# Patient Record
Sex: FEMALE | Race: WHITE | Employment: PART TIME | URBAN - METROPOLITAN AREA
[De-identification: names, ages, dates, MRNs, and addresses within clinical notes are randomized per-mention and may not be internally consistent; named-entity substitution may affect disease eponyms.]

---

## 2017-01-13 DIAGNOSIS — N94.6 DYSMENORRHEA: ICD-10-CM

## 2017-01-13 RX ORDER — DESOGESTREL AND ETHINYL ESTRADIOL 0.15-0.03
KIT ORAL
Qty: 4 PACKET | Refills: 0 | Status: SHIPPED | OUTPATIENT
Start: 2017-01-13 | End: 2018-08-03

## 2017-01-13 NOTE — TELEPHONE ENCOUNTER
Please check with patient for date of last Pap. Interval is usually every 3 years. We don't have any previous results on record and I usually do not write for contraceptives as I don't manage women's health issues. If she is due for Pap smear I would like her to have Ob/Gyn take over OCP management.      Requested Prescriptions     Signed Prescriptions Disp Refills    APRI 0.15-0.03 mg tab 4 Packet 0     Sig: TAKE 1 TABLET BY MOUTH EVERY DAY     Authorizing Provider: Ramesh Ferrera

## 2017-02-22 ENCOUNTER — TELEPHONE (OUTPATIENT)
Dept: FAMILY MEDICINE CLINIC | Age: 22
End: 2017-02-22

## 2017-02-22 NOTE — TELEPHONE ENCOUNTER
Dr. Meir Hernandez, is there another dosage associated with this birth control for dx dysmenorrhea? Please advise if we are prescribing for pt per last telephone encounter. FYI last rx sent to [facility].

## 2017-02-22 NOTE — TELEPHONE ENCOUNTER
Pt is calling about her Birth control wants to know can she have a higher doseage because she believes it is not working

## 2017-02-23 ENCOUNTER — TELEPHONE (OUTPATIENT)
Dept: FAMILY MEDICINE CLINIC | Age: 22
End: 2017-02-23

## 2017-02-23 NOTE — TELEPHONE ENCOUNTER
Pt called back checking on status of request for new birth control. She states she also went to the ER last night and they diagnosed her with PCOS.  Please contact back when possible

## 2017-02-23 NOTE — TELEPHONE ENCOUNTER
As far as I know, oral contraceptive dosing isn't adjusted. It's one pill a day. The lower the dose, the less risk of adverse effects. What ER was she in? I don't see any records in [place] [facility]? Is she talking about an ER in [place]? Please ask her to request records be faxed to me for review. I can't really weigh in on the diagnosis of PCOS without their testing results, but regardless she needs to schedule with a gynecologist (if [place] if that is where she is currently) to be tested/managed. They can talk with her about changing contraceptive/hormonal treatment at that time.

## 2018-08-03 ENCOUNTER — OFFICE VISIT (OUTPATIENT)
Dept: FAMILY MEDICINE CLINIC | Age: 23
End: 2018-08-03

## 2018-08-03 VITALS
HEIGHT: 64 IN | RESPIRATION RATE: 20 BRPM | HEART RATE: 100 BPM | OXYGEN SATURATION: 96 % | TEMPERATURE: 98.1 F | WEIGHT: 165.4 LBS | DIASTOLIC BLOOD PRESSURE: 80 MMHG | SYSTOLIC BLOOD PRESSURE: 110 MMHG | BODY MASS INDEX: 28.24 KG/M2

## 2018-08-03 DIAGNOSIS — Z00.00 ROUTINE GENERAL MEDICAL EXAMINATION AT A HEALTH CARE FACILITY: Primary | ICD-10-CM

## 2018-08-03 DIAGNOSIS — Z13.29 SCREENING FOR THYROID DISORDER: ICD-10-CM

## 2018-08-03 DIAGNOSIS — Z13.220 SCREENING, LIPID: ICD-10-CM

## 2018-08-03 DIAGNOSIS — Z13.21 ENCOUNTER FOR VITAMIN DEFICIENCY SCREENING: ICD-10-CM

## 2018-08-03 DIAGNOSIS — Z13.21 SCREENING FOR ENDOCRINE, NUTRITIONAL, METABOLIC AND IMMUNITY DISORDER: ICD-10-CM

## 2018-08-03 DIAGNOSIS — F41.0 ANXIETY ATTACK: ICD-10-CM

## 2018-08-03 DIAGNOSIS — Z13.29 SCREENING FOR ENDOCRINE, NUTRITIONAL, METABOLIC AND IMMUNITY DISORDER: ICD-10-CM

## 2018-08-03 DIAGNOSIS — Z13.1 SCREENING FOR DIABETES MELLITUS: ICD-10-CM

## 2018-08-03 DIAGNOSIS — Z13.0 SCREENING FOR ENDOCRINE, NUTRITIONAL, METABOLIC AND IMMUNITY DISORDER: ICD-10-CM

## 2018-08-03 DIAGNOSIS — Z13.89 SCREENING FOR BLOOD OR PROTEIN IN URINE: ICD-10-CM

## 2018-08-03 DIAGNOSIS — R00.2 INTERMITTENT PALPITATIONS: ICD-10-CM

## 2018-08-03 DIAGNOSIS — Z13.228 SCREENING FOR ENDOCRINE, NUTRITIONAL, METABOLIC AND IMMUNITY DISORDER: ICD-10-CM

## 2018-08-03 LAB
25(OH)D3 SERPL-MCNC: 35.4 NG/ML (ref 32–100)
A-G RATIO,AGRAT: 1.7 RATIO (ref 1.1–2.6)
ABSOLUTE LYMPHOCYTE COUNT, 10803: 2.4 K/UL (ref 1–4.8)
ALBUMIN SERPL-MCNC: 4.5 G/DL (ref 3.5–5)
ALP SERPL-CCNC: 96 U/L (ref 25–115)
ALT SERPL-CCNC: 21 U/L (ref 5–40)
ANION GAP SERPL CALC-SCNC: 16 MMOL/L
AST SERPL W P-5'-P-CCNC: 17 U/L (ref 10–37)
AVG GLU, 10930: 105 MG/DL (ref 91–123)
BASOPHILS # BLD: 0 K/UL (ref 0–0.2)
BASOPHILS NFR BLD: 0 % (ref 0–2)
BILIRUB SERPL-MCNC: 0.4 MG/DL (ref 0.2–1.2)
BILIRUB UR QL: NEGATIVE
BUN SERPL-MCNC: 11 MG/DL (ref 6–22)
CALCIUM SERPL-MCNC: 9.6 MG/DL (ref 8.4–10.5)
CHLORIDE SERPL-SCNC: 99 MMOL/L (ref 98–110)
CHOLEST SERPL-MCNC: 178 MG/DL (ref 110–200)
CO2 SERPL-SCNC: 26 MMOL/L (ref 20–32)
CREAT SERPL-MCNC: 0.8 MG/DL (ref 0.5–1.2)
EOSINOPHIL # BLD: 0.4 K/UL (ref 0–0.5)
EOSINOPHIL NFR BLD: 7 % (ref 0–6)
EPITHELIAL,EPSU: ABNORMAL /HPF (ref 0–2)
ERYTHROCYTE [DISTWIDTH] IN BLOOD BY AUTOMATED COUNT: 12.3 % (ref 10–15.5)
GFRAA, 66117: >60
GFRNA, 66118: >60
GLOBULIN,GLOB: 2.6 G/DL (ref 2–4)
GLUCOSE SERPL-MCNC: 86 MG/DL (ref 70–99)
GLUCOSE UR QL: NEGATIVE MG/DL
GRANULOCYTES,GRANS: 43 % (ref 40–75)
HBA1C MFR BLD HPLC: 5.3 % (ref 4.8–5.9)
HCT VFR BLD AUTO: 42.3 % (ref 35.1–46.5)
HDLC SERPL-MCNC: 2.9 MG/DL (ref 0–5)
HDLC SERPL-MCNC: 62 MG/DL (ref 40–59)
HGB BLD-MCNC: 13.8 G/DL (ref 11.7–15.5)
HGB UR QL STRIP: NEGATIVE
KETONES UR QL STRIP.AUTO: NEGATIVE MG/DL
LDLC SERPL CALC-MCNC: 104 MG/DL (ref 50–99)
LEUKOCYTE ESTERASE: ABNORMAL
LYMPHOCYTES, LYMLT: 43 % (ref 20–45)
MCH RBC QN AUTO: 29 PG (ref 26–34)
MCHC RBC AUTO-ENTMCNC: 33 G/DL (ref 31–36)
MCV RBC AUTO: 89 FL (ref 80–95)
MONOCYTES # BLD: 0.4 K/UL (ref 0.1–1)
MONOCYTES NFR BLD: 7 % (ref 3–12)
NEUTROPHILS # BLD AUTO: 2.3 K/UL (ref 1.8–7.7)
NITRITE UR QL STRIP.AUTO: NEGATIVE
PH UR STRIP: 7 PH (ref 5–8)
PLATELET # BLD AUTO: 317 K/UL (ref 140–440)
PMV BLD AUTO: 10 FL (ref 9–13)
POTASSIUM SERPL-SCNC: 4.1 MMOL/L (ref 3.5–5.5)
PROT SERPL-MCNC: 7.1 G/DL (ref 6.4–8.3)
PROT UR QL STRIP: NEGATIVE MG/DL
RBC # BLD AUTO: 4.78 M/UL (ref 3.8–5.2)
RBC #/AREA URNS HPF: ABNORMAL /HPF
SODIUM SERPL-SCNC: 141 MMOL/L (ref 133–145)
SP GR UR: 1.02 (ref 1–1.03)
T4 FREE SERPL-MCNC: 1.4 NG/DL (ref 0.9–1.8)
TRIGL SERPL-MCNC: 60 MG/DL (ref 40–149)
TSH SERPL DL<=0.005 MIU/L-ACNC: 1.9 MCU/ML (ref 0.27–4.2)
UROBILINOGEN UR STRIP-MCNC: <2 MG/DL
VLDLC SERPL CALC-MCNC: 12 MG/DL (ref 8–30)
WBC # BLD AUTO: 5.5 K/UL (ref 4–11)
WBC URNS QL MICRO: ABNORMAL /HPF (ref 0–2)

## 2018-08-03 RX ORDER — DROSPIRENONE AND ETHINYL ESTRADIOL 0.02-3(28)
KIT ORAL DAILY
COMMUNITY

## 2018-08-03 RX ORDER — ALPRAZOLAM 0.25 MG/1
TABLET ORAL
Qty: 12 TAB | Refills: 0 | Status: SHIPPED | OUTPATIENT
Start: 2018-08-03 | End: 2019-02-26 | Stop reason: SDUPTHER

## 2018-08-03 NOTE — PROGRESS NOTES
Cassandra Walls is a 21 y.o. female (: 1995) presenting to address: Chief Complaint Patient presents with  Medication Evaluation Vitals:  
 18 1045 BP: 110/80 Pulse: 100 Resp: 20 Temp: 98.1 °F (36.7 °C) TempSrc: Oral  
SpO2: 96% Weight: 165 lb 6.4 oz (75 kg) Height: 5' 4\" (1.626 m) PainSc:   0 - No pain LMP: 2018 Hearing/Vision: No exam data present Learning Assessment:  
 
Learning Assessment 2015 PRIMARY LEARNER Patient HIGHEST LEVEL OF EDUCATION - PRIMARY LEARNER  SOME COLLEGE  
BARRIERS PRIMARY LEARNER NONE  
CO-LEARNER CAREGIVER No  
PRIMARY LANGUAGE ENGLISH  NEED No  
LEARNER PREFERENCE PRIMARY READING  
LEARNING SPECIAL TOPICS none ANSWERED BY patient RELATIONSHIP SELF  
ASSESSMENT COMMENT n/a Depression Screening: PHQ over the last two weeks 8/3/2018 Little interest or pleasure in doing things Not at all Feeling down, depressed, irritable, or hopeless Not at all Total Score PHQ 2 0 Fall Risk Assessment:  
No flowsheet data found. Abuse Screening:  
 
Abuse Screening Questionnaire 8/3/2018 Do you ever feel afraid of your partner? Roberto Alex Are you in a relationship with someone who physically or mentally threatens you? Roberto Willyamy Is it safe for you to go home? Nat Watkins Coordination of Care Questionaire: 1. Have you been to the ER, urgent care clinic since your last visit? Hospitalized since your last visit? YES  ER 
2. Have you seen or consulted any other health care providers outside of the [address] [place] [address] since your last visit? Include any pap smears or colon screening. NO Advanced Directive: 1. Do you have an Advanced Directive? NO 
 
2. Would you like information on Advanced Directives? NO Clinic does not have flu shots available at this time.

## 2018-08-03 NOTE — PROGRESS NOTES
PRE-VISIT PLANNING:    
Future Appointments Date Time Provider Nikita Reynoldsi 8/3/2018 11:00 AM Corazon Mayberry  W. [place] Darion Personsusana Garcianest (PCP is Corazon Mayberry MD) is scheduled for an office visit with Corazon Mayberry MD on 2018 for annual preventive visit. Encounter opened prior to visit to complete pre-visit planning, update and review pertinent sections in the chart. Last office visit with PCP was 12/18/15. Rooming Nurse Items: 
-- Release for Pap smear report 
-- TDAP 
-- Offer flu shot to patient per office policy. Document in nursing note if clinic does not have flu shot in stock, if patient declines or if patient reports having this season's flu shot administered elsewhere (please note both date of admin and clinic/pharmacy pt reports provided vaccine). Pending items for provider to review with patient: 
-- Recent ER notes from last month received re: panic attack/anxiety, p/w tachycardia, shaking, numbness/tingling in all extremities, all symptoms resolved with lorazepam, EKG confirmed sinus tach in 120s - script was provided for Novel Due Topic Date Due  
 DTaP/Tdap/Td series (1 - Tdap) 2016  PAP AKA CERVICAL CYTOLOGY  2016  HPV Age 9Y-34Y (2 of 3 - Female 3 Dose Series) 2016  Influenza Age 5 to Adult  2018 Internal Medicine/Primary Care Preventive Care Visit [address] at [place] [address] [address] [facility] [place], [facility], [address] Phone [phone]; Fax [phone] Date of Service:  2018 Patient's Name & : Denise Aguilar 1995 Assessment/Plan:    
 
Denise Aguilar was seen today for annual preventive visit. Well 21year old. Fasting routine labs today Pt declines TDAP (hates needles), but agrees that if she gets any injuries from a frandy object she will immediately seek care to obtain tetanus booster.    
Recent ER visit for anxiety attack discussed, one time short term script with no refill for low dose PRN Xanax provided to patient today with counseling provided to patient re: controlled medication, need for patient to protect medication due to risk of abuse/diversion, potential for abuse/dependence, sedative effect, never to combine with other sedative meds or alcohol, need for office follow up if anxiety attacks do recur She states she has not had a Pap smear, [facility]. She will resume generic for Pauline for a 6 month trial to try to address painful ovulation, had a low K in ER after 1 month on generic for Pauline 
HM recommendations reviewed with patient. Yearly well visit Body mass index is 28.39 kg/(m^2). Change in Patient Weight by Encounter [phone]) 
 
(08/03/2018) Office Visit Last Recorded Weight: 75 kg (165 lb 6.4 oz) Ideal body weight: 120 lb 9.5 oz (54.7 kg) Adjusted ideal body weight: 138 lb 8.2 oz (62.8 kg) Discussed lifestyle modifications with focus on healthy diet, regular workouts. Follow up weight/BMI at next visit. The patient states understanding of recommended treatment plan. Orders Placed This Encounter  CBC WITH AUTOMATED DIFF  
 METABOLIC PANEL, COMPREHENSIVE  LIPID PANEL  
 HEMOGLOBIN A1C WITH EAG  
 TSH AND FREE T4  
 URINALYSIS W/ RFLX MICROSCOPIC  VITAMIN D, 25 HYDROXY  drospirenone-ethinyl estradiol (LORYNA, 28,) 3-0.02 mg tab  ALPRAZolam (XANAX) 0.25 mg tablet Patient Instructions Labs today A HEALTHY LIFESTYLE IS RECOMMENDED FOR EVERYONE! Your doctor recommends a lifestyle that incorporates daily exercise and a diet focused on whole, unprocessed foods. Aim to follow a diet of 2/3 non-starchy vegetables and low glycemic impact fruits and 1/3 lean proteins. Avoid processed/refined carbohydrates (especially bread products, bakery items, sugary drinks, cereals, crackers and sweets).   Minimum 30 minutes of cardio and weight training/resistance exercise daily to build muscle, reduce insulin sensitivity and increase resting fat & calorie burning capacity. TESTING RESULTS Normal results will be released to [employer] or sent by [address] mail. [address] [facility] #[phone]. Results of tests performed at outside facilities will not appear in [address] If you have questions about your results, please feel free to schedule a follow up appointment to discuss your concerns with your PCP. MEDICATION REFILLS   
 
-- Medication refills should be requested at least 2 business days in advance through your pharmacy. Refills will not be provided by the after hours/on call provider. Kristopher Otero MD - Internal Medicine 8/3/2018, 8:21 PM 
 
Subjective/Discussion: CHIEF COMPLAINT:  Preventive visit/CPE Bryan Jauregui is a 21 y.o. female presenting today for annual preventive visit. Started a new OCP from [facility] [employer], generic for [employer], just started 1 month ago. Stopped 1 week ago. Was spotting the whole time. Discussed recent ER visit while in [place] for palpitations and feeling of numbness/shakiness in all extremities. Discussed diagnosis of anxiety attack. She is not having daily anxiety. She has had similar short-lived episodes with similar symptoms that resolved in minutes, has never had palpitations last more than 4-5 minutes. She doesn't feel she was under stress at the time, had not been sleep deprived or otherwise predisposed to anxiety. 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 She has been doing this for over a year now and in the past year has had only 3-4 episodes of brief palpitations besides the episode that sent her to the ER 7/25/18. She has \"no home base\".   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 Discussed regular exercise: Y Discussed healthy diet:  Y Discussed sun protection:  Y Discussed always wearing seatbelt in automobile:  Y Discussed distracted driving, advised not to text while driving:  Y Change to family history: N Health Maintenance Topic Date Due  
 DTaP/Tdap/Td series (1 - Tdap) 01/05/2016  PAP AKA CERVICAL CYTOLOGY  01/05/2016  HPV Age 9Y-34Y (2 of 3 - Female 3 Dose Series) 02/12/2016  Influenza Age 5 to Adult  08/01/2018 Patient Active Problem List  
 Diagnosis  Influenza vaccination declined by patient  Chest pain on exertion  Paresthesia of left arm  Abnormal vascular flow  Routine adult health maintenance  Heart palpitations  Lipoma of neck Small (~2 cm) and nontender  Acne  Dysmenorrhea Controlled on OCPs Review of Systems Constitutional: Negative for chills, diaphoresis, fever, malaise/fatigue and weight loss. HENT: Negative for congestion, ear discharge, ear pain, hearing loss, nosebleeds, sinus pain, sore throat and tinnitus. Eyes: Negative for blurred vision, double vision, photophobia, pain, discharge and redness. Respiratory: Negative for cough, hemoptysis, sputum production, shortness of breath, wheezing and stridor. Cardiovascular: Positive for palpitations. Negative for chest pain, orthopnea, claudication, leg swelling and PND. Gastrointestinal: Negative for abdominal pain, blood in stool, constipation, diarrhea, heartburn, melena, nausea and vomiting. Genitourinary: Negative for dysuria, flank pain, frequency, hematuria and urgency. Musculoskeletal: Negative for back pain, falls, joint pain, myalgias and neck pain. Skin: Negative for itching and rash. Neurological: Positive for sensory change. Negative for dizziness, tingling, tremors, speech change, focal weakness, seizures, loss of consciousness, weakness and headaches. Endo/Heme/Allergies: Negative for polydipsia. Does not bruise/bleed easily. Psychiatric/Behavioral: Negative for depression, hallucinations, memory loss, substance abuse and suicidal ideas. The patient is not nervous/anxious and does not have insomnia. Objective:    
/80 (BP 1 Location: Left arm, BP Patient Position: Sitting)  Pulse 100  Temp 98.1 °F (36.7 °C) (Oral)   Resp 20  Ht 5' 4\" (1.626 m)  Wt 165 lb 6.4 oz (75 kg)  LMP 07/03/2018 (Approximate)  SpO2 96%  BMI 28.39 kg/m2 Physical Exam  
Constitutional: She is oriented to person, place, and time. She appears well-developed and well-nourished. No distress. Well-appearing age appropriate female seated comfortably in exam room chair, affect is bright and open HENT:  
Head: Normocephalic and atraumatic. Right Ear: Tympanic membrane, external ear and ear canal normal.  
Left Ear: Tympanic membrane, external ear and ear canal normal.  
Nose: Nose normal.  
Mouth/Throat: Oropharynx is clear and moist and mucous membranes are normal. No oropharyngeal exudate. Eyes: Conjunctivae, EOM and lids are normal. Pupils are equal, round, and reactive to light. Right eye exhibits no discharge. Left eye exhibits no discharge. No scleral icterus. Neck: Normal range of motion. Neck supple. No JVD present. No thyromegaly present. Cardiovascular: Normal rate, regular rhythm, normal heart sounds and intact distal pulses. Exam reveals no gallop and no friction rub. No murmur heard. Pulmonary/Chest: Effort normal and breath sounds normal. No stridor. No respiratory distress. She has no wheezes. She has no rales. She exhibits no tenderness. Abdominal: Soft. Bowel sounds are normal. She exhibits no distension and no mass. There is no tenderness. There is no rebound and no guarding.   
Musculoskeletal: Normal range of motion. She exhibits no edema, tenderness or deformity. Neurological: She is alert and oriented to person, place, and time. She displays no atrophy and no tremor. No cranial nerve deficit. She exhibits normal muscle tone. Coordination and gait normal.  
Skin: Skin is warm and dry. No rash noted. She is not diaphoretic. No erythema. No pallor. Psychiatric: She has a normal mood and affect. Her behavior is normal. Judgment and thought content normal.  
 
 
Laboratory/Testing Results:  
 
No visits with results within 2 Week(s) from this visit. Latest known visit with results is: Abstract on 04/14/2016 Component Date Value Ref Range Status  EF %, External 10/15/2015 50% or >   Final  
 
 
Additional History Past Medical History:  
Diagnosis Date  Acne  Dysmenorrhea Controlled on OCPs, doing better on desogen  Heart palpitations 7/2/2014 Seen by Dr. Marina Baker, 24 hour monitor didn't catch much, but pt noting poor communication/instructions and then equipment malfunction  Lipoma of neck 7/2/2014 Small (~2 cm) and nontender  Ovarian cyst   
 Hx of cyst, pain with rupture No past surgical history on file. Social History Substance Use Topics  Smoking status: Never Smoker  Smokeless tobacco: Never Used  Alcohol use No  
 
Current Outpatient Prescriptions Medication Sig  
 drospirenone-ethinyl estradiol (LORYNA, 28,) 3-0.02 mg tab Take  by mouth daily.  ALPRAZolam (XANAX) 0.25 mg tablet Take one tablet as needed for palpitations/heart racing that lasts > 3 minutes. Max 2 tablets in 24 hours. No current facility-administered medications for this visit. Allergies Allergen Reactions  Sulfa (Sulfonamide Antibiotics) Rash Hives Encounter Diagnoses:  
 
Encounter Diagnoses ICD-10-CM ICD-9-CM 1. Routine general medical examination at a health care facility Z00.00 V70.0 2.  Screening for endocrine, nutritional, metabolic and immunity disorder Z13.29 V77.99  
 Z13.21   
 Z13.228   
 Z13.0 3. Screening, lipid Z13.220 V77.91  
4. Screening for diabetes mellitus Z13.1 V77.1 5. Screening for thyroid disorder Z13.29 V77.0 6. Screening for blood or protein in urine Z13.89 V82.9 7. Encounter for vitamin deficiency screening Z13.21 V77.99  
8. Anxiety attack F41.0 300.01  
9. Intermittent palpitations R00.2 785. 1 This document may have been created with the aid of dictation software. Text may contain errors, particularly phonetic errors.

## 2018-08-03 NOTE — PATIENT INSTRUCTIONS
Labs today A HEALTHY LIFESTYLE IS RECOMMENDED FOR EVERYONE! Your doctor recommends a lifestyle that incorporates daily exercise and a diet focused on whole, unprocessed foods. Aim to follow a diet of 2/3 non-starchy vegetables and low glycemic impact fruits and 1/3 lean proteins. Avoid processed/refined carbohydrates (especially bread products, bakery items, sugary drinks, cereals, crackers and sweets). Minimum 30 minutes of cardio and weight training/resistance exercise daily to build muscle, reduce insulin sensitivity and increase resting fat & calorie burning capacity. TESTING RESULTS Normal results will be released to [employer] or sent by [address] mail. [address] [facility] #[phone]. Results of tests performed at outside facilities will not appear in [address] If you have questions about your results, please feel free to schedule a follow up appointment to discuss your concerns with your PCP. MEDICATION REFILLS   
 
-- Medication refills should be requested at least 2 business days in advance through your pharmacy. Refills will not be provided by the after hours/on call provider.

## 2018-08-14 NOTE — PROGRESS NOTES
LDL cholesterol increased a little, no medication indicated, but healthy diet/lifestyle is recommended. Otherwise labs show no significant abnormalities.

## 2019-02-22 ENCOUNTER — TELEPHONE (OUTPATIENT)
Dept: FAMILY MEDICINE CLINIC | Age: 24
End: 2019-02-22

## 2019-02-22 NOTE — TELEPHONE ENCOUNTER
Patient calling to request referral for Cardiology. Referral is for the following condition or symptoms: She said she has been seeing Dr. Liang Tristan about a heart issue. They would like the referral to go to [facility] on [address] [facility]    Last office visit with [facility]. provider was 08/03/2018    Patient was advised that an appointment may or may not be needed.

## 2019-02-26 ENCOUNTER — OFFICE VISIT (OUTPATIENT)
Dept: FAMILY MEDICINE CLINIC | Age: 24
End: 2019-02-26

## 2019-02-26 VITALS
OXYGEN SATURATION: 98 % | RESPIRATION RATE: 16 BRPM | WEIGHT: 159.6 LBS | HEART RATE: 113 BPM | SYSTOLIC BLOOD PRESSURE: 134 MMHG | HEIGHT: 64 IN | BODY MASS INDEX: 27.25 KG/M2 | DIASTOLIC BLOOD PRESSURE: 86 MMHG | TEMPERATURE: 97.7 F

## 2019-02-26 DIAGNOSIS — F41.0 ANXIETY ATTACK: ICD-10-CM

## 2019-02-26 DIAGNOSIS — K92.1 BLACK TARRY STOOLS: ICD-10-CM

## 2019-02-26 DIAGNOSIS — R09.89 ABNORMAL VASCULAR FLOW: Chronic | ICD-10-CM

## 2019-02-26 DIAGNOSIS — K92.1 BLOOD IN STOOL: ICD-10-CM

## 2019-02-26 DIAGNOSIS — R20.2 PARESTHESIA OF LEFT ARM: Chronic | ICD-10-CM

## 2019-02-26 DIAGNOSIS — R00.2 INTERMITTENT PALPITATIONS: ICD-10-CM

## 2019-02-26 DIAGNOSIS — R07.89 CHEST TIGHTNESS: ICD-10-CM

## 2019-02-26 DIAGNOSIS — R00.0 TACHYCARDIA: ICD-10-CM

## 2019-02-26 DIAGNOSIS — R07.9 CHEST PAIN ON EXERTION: Chronic | ICD-10-CM

## 2019-02-26 DIAGNOSIS — J01.00 ACUTE NON-RECURRENT MAXILLARY SINUSITIS: ICD-10-CM

## 2019-02-26 DIAGNOSIS — R68.89 TEMPERATURE INTOLERANCE: ICD-10-CM

## 2019-02-26 DIAGNOSIS — R00.2 HEART PALPITATIONS: Primary | Chronic | ICD-10-CM

## 2019-02-26 DIAGNOSIS — R10.9 ABDOMINAL PAIN, UNSPECIFIED ABDOMINAL LOCATION: ICD-10-CM

## 2019-02-26 DIAGNOSIS — R12 HEARTBURN: ICD-10-CM

## 2019-02-26 RX ORDER — METOPROLOL SUCCINATE 25 MG/1
25 TABLET, EXTENDED RELEASE ORAL DAILY
Qty: 30 TAB | Refills: 0 | Status: SHIPPED | OUTPATIENT
Start: 2019-02-26 | End: 2019-03-20 | Stop reason: SDUPTHER

## 2019-02-26 RX ORDER — DOXYCYCLINE 100 MG/1
100 CAPSULE ORAL 2 TIMES DAILY
Qty: 20 CAP | Refills: 0 | Status: SHIPPED | OUTPATIENT
Start: 2019-02-26 | End: 2019-03-08

## 2019-02-26 RX ORDER — ALPRAZOLAM 0.25 MG/1
TABLET ORAL
Qty: 12 TAB | Refills: 0 | Status: SHIPPED | OUTPATIENT
Start: 2019-02-26 | End: 2022-10-24

## 2019-02-26 NOTE — PATIENT INSTRUCTIONS
Labs today     6 week trial of OTC prilosec    Stop amoxicillin    Start doxycycline for sinus symptoms - you can combine this with OTC flonase 2 sprays in each nostril daily     Referral to cardiology is being processed (to try to get the earliest possible appointment)     Labs today     Follow up in 6 weeks

## 2019-02-26 NOTE — PROGRESS NOTES
Nathalie An is a 25 y.o. female (: 1995) presenting to address:    Chief Complaint   Patient presents with    Sinus Infection    Palpitations     Patient Declines Flu shot , HPV vaccine and TDAP vaccine      Vitals:    19 1326   BP: 134/86   Pulse: (!) 113   Resp: 16   Temp: 97.7 °F (36.5 °C)   TempSrc: Oral   SpO2: 98%   Weight: 159 lb 9.6 oz (72.4 kg)   Height: 5' 4\" (1.626 m)   PainSc:   5   PainLoc: Chest   LMP: 2019       Hearing/Vision:   No exam data present    Learning Assessment:     Learning Assessment 2015   PRIMARY LEARNER Patient   HIGHEST LEVEL OF EDUCATION - PRIMARY LEARNER  SOME COLLEGE   BARRIERS PRIMARY LEARNER NONE   CO-LEARNER CAREGIVER No   PRIMARY LANGUAGE ENGLISH    NEED No   LEARNER PREFERENCE PRIMARY READING   LEARNING SPECIAL TOPICS none   ANSWERED BY patient   RELATIONSHIP SELF   ASSESSMENT COMMENT n/a     Depression Screening:     3 most recent PHQ Screens 2019   Little interest or pleasure in doing things Not at all   Feeling down, depressed, irritable, or hopeless Not at all   Total Score PHQ 2 0     Fall Risk Assessment:   No flowsheet data found. Abuse Screening:     Abuse Screening Questionnaire 8/3/2018   Do you ever feel afraid of your partner? N   Are you in a relationship with someone who physically or mentally threatens you? N   Is it safe for you to go home? Y     Coordination of Care Questionaire:   1. Have you been to the ER, urgent care clinic since your last visit? Hospitalized since your last visit? NO    2. Have you seen or consulted any other health care providers outside of the [address] [place] [address] since your last visit? Include any pap smears or colon screening. NO    Advanced Directive:   1. Do you have an Advanced Directive? NO    2. Would you like information on Advanced Directives?  NO

## 2019-02-26 NOTE — PROGRESS NOTES
PRE-VISIT PLANNING:     PATIENT NAME:  Alisa Messina   :  1995   PCP:  Rinaldo Rinne, MD     Future Appointments   Date Time Provider Nikita Anaya   2019  1:30 PM Rinaldo Rinne, MD [facility] HARSHAD Rainey is scheduled for an office visit with Rashad Alvarado MD on 2019 for referral request, palpitations. Encounter opened prior to visit to complete pre-visit planning. Last office visit with PCP was 8/3/18. Pending issues:  -- Palpitations (chronic intermittent), has had prior cardiology evaluation (scanned to media section)    There are no preventive care reminders to display for this patient. Rooming Nurse:     -- Offer flu shot per office policy. Document in nursing note if patient declines (document reason) or if clinic is out of stock. If pt reports this season's (Aug 2018-2019) flu shot was administered elsewhere, note both date of admin and clinic/pharmacy that reportedly provided vaccine. -- Offer HPV vaccine   -- Offer TDAP   -- Release for Shingrix vaccine administration reports (if done) for Pap          Internal Medicine  Follow Up Note  [address] at [place]  [address], [facility], [place], [address]  Phone [phone]; Fax [phone]    Date of Service:  2019  Patient's Name & : Alisa Messina - 1995     Assessment/Plan/Orders:       Alisa Messina is a 25 y.o. female who was seen today for follow up. The primary encounter diagnosis was Heart palpitations. Diagnoses of Abnormal vascular flow, Chest pain on exertion, Paresthesia of left arm, Black tarry stools, Abdominal pain, unspecified abdominal location, Heartburn, Chest tightness, Temperature intolerance, Tachycardia, Acute non-recurrent maxillary sinusitis, Anxiety attack, and Intermittent palpitations were also pertinent to this visit.    EKG with sinus tach (rate 127), IA WNL, QTc WNL today   Unclear etiology of worsening symptoms, suspicion is that symptoms are cardiac in origin as pt does not endorse significant anxiety, does not appear anxious in the office today and HR is persistently elevated here. Review of previous workup demonstrates she had 24 hour Holter in the past, but wasn't given appropriate instructions to push button when symptomatic the first time. Recent eval with Dr. Heide Murphy in [place], [place] (while at [facility]). Symptoms no longer correlate directly with exercise. Has hx of several years of LUE pain/tingling that radiates into RUE and neck, worse with elevating arms above shoulder level. LUE arterial doppler from Dr. Enciso Media office read as Daralene Number is a significant diminish of left arm waveforms at the 180 degrees level, suggestive of mild obstruction\". PRN Xanax does not resolve symptoms    Refer to cardiology locally, EKGs and echo from [place] included with faxed referral, patient will carry a copy of her records to appointment   Would like to get monitor updated before starting beta blockade, but if unable to secure appt in 1-2 weeks will go ahead and start a beta blocker  Labs today to eval for medical contributors to tachycardia and palpitations  Doubt amoxicillin is causing increase in symptoms, but will replace with doxycycline for treatment of acute sinusitis   New black tarry stools - if anemic will need GI evaluation  6 week trial of OTC PPI for GERD/heartburn/belching/abdominal pains  RTC 6 weeks, sooner PRN  Doesn't need Pap until sexually active    Patient Instructions   Labs today     6 week trial of OTC prilosec    Stop amoxicillin    Start doxycycline for sinus symptoms - you can combine this with OTC flonase 2 sprays in each nostril daily     Referral to cardiology is being processed (to try to get the earliest possible appointment)     Labs today     Follow up in 6 weeks      The patient states understanding of recommended treatment plan.       Orders Placed This Encounter  T4, FREE    CBC WITH AUTOMATED DIFF    IRON    METANEPHRINES, PLASMA    METABOLIC PANEL, COMPREHENSIVE    D DIMER    REFERRAL TO CARDIOLOGY    AMB POC EKG ROUTINE W/ 12 LEADS, INTER & REP    doxycycline (VIBRAMYCIN) 100 mg capsule    ALPRAZolam (XANAX) 0.25 mg tablet    metoprolol succinate (TOPROL-XL) 25 mg XL tablet       Robin Chapin MD - Internal Medicine  02/26/2019, 2:04 PM    HPI & ROS:     Chief Complaint   Patient presents with    Sinus Infection    Palpitations         Wendi Schrader presents for follow up. Hadn't had any significant episodes of anxiety or palpitations until last Thursday, had severe episode lasting 30 minutes while seated in car. Had to pull over and have grandma drive her home. Felt very lightheaded, heart palpitations and racing, chest tightness and pressure, fluttering feeling, dyspnea, lightheaded/dizzy, soaked in sweat, flushed, left arm pain that radiates down arm and into neck and chest with LUE numbness/tingling. Can happen day or night. Symptoms not specifically exertional.  Symptoms sometimes awaken her from sleep with HR in 130s. Symptoms improve with standing up. Sitting or bending over makes symptoms worse. Has been having temperature control issues - alternating between hot and cold. Got apple watch 4 to be able to check ECGs, but it says indeterminate when HR is fast.  Has been watching HR on smart watch at home (she will fax me the PDF document), resting HR 65-85 with HR varying up to 188 (worked out that morning and had severe episode of symptoms that day). Having a lot of GI symptoms - abdominal pain, alternating constipation and diarrhea with tarry black stools, gas/eruptation, flatulence, bloating. Took prilosec for a full week, isn't sure but thinks it may have helped    Sniffling through weekend. Had a fever yesterday with pressure in right max sinus. Used [employer] service and was put on amoxicillin.   Started on amoxicillin yesterday for sinus infection and last night couldn't sleep at all. Heart pounding with rate in 130s. Is currently having palpitations and symptoms, better when she stands up. Review of Systems   Constitutional: Positive for diaphoresis and malaise/fatigue. Negative for chills, fever and weight loss. HENT: Positive for congestion, ear pain, sinus pain and sore throat. Negative for tinnitus. Respiratory: Positive for shortness of breath. Negative for stridor. Cardiovascular: Positive for chest pain, palpitations and orthopnea. Negative for claudication, leg swelling and PND. Gastrointestinal: Positive for abdominal pain, diarrhea, heartburn and melena. Neurological: Positive for dizziness and tingling. Negative for tremors, sensory change, focal weakness, weakness and headaches. Patient Active Problem List    Diagnosis    Influenza vaccination declined by patient    Chest pain on exertion    Paresthesia of left arm    Abnormal vascular flow    Routine adult health maintenance    Heart palpitations    Lipoma of neck     Small (~2 cm) and nontender      Acne    Dysmenorrhea     Controlled on OCPs         Objective:     /86 (BP 1 Location: Left arm, BP Patient Position: Sitting)   Pulse (!) 113   Temp 97.7 °F (36.5 °C) (Oral)   Resp 16   Ht 5' 4\" (1.626 m)   Wt 159 lb 9.6 oz (72.4 kg)   LMP 01/26/2019   SpO2 98%   BMI 27.40 kg/m²     Physical Exam   Constitutional: She is oriented to person, place, and time. She appears well-developed and well-nourished. No distress. HENT:   Head: Normocephalic and atraumatic. Right Ear: Hearing, external ear and ear canal normal. Tympanic membrane is injected. A middle ear effusion is present. Left Ear: Hearing, external ear and ear canal normal. A middle ear effusion is present. Nose: Mucosal edema, rhinorrhea and septal deviation (deviates towards left) present. Right sinus exhibits maxillary sinus tenderness. Right sinus exhibits no frontal sinus tenderness. Left sinus exhibits no maxillary sinus tenderness and no frontal sinus tenderness. +TTP over ethmoids bilaterally   Eyes: Conjunctivae and EOM are normal. Right eye exhibits no discharge. Left eye exhibits no discharge. No scleral icterus. Neck: Neck supple. Cardiovascular: Regular rhythm, normal heart sounds and normal pulses. No extrasystoles are present. Tachycardia present. Exam reveals no gallop, no distant heart sounds and no friction rub. No murmur heard. Pulmonary/Chest: Effort normal and breath sounds normal. No stridor. No respiratory distress. She has no wheezes. She has no rales. She exhibits no tenderness. Neurological: She is alert and oriented to person, place, and time. She exhibits normal muscle tone. Coordination normal.   Skin: Skin is warm and dry. She is not diaphoretic. Psychiatric: She has a normal mood and affect. Her speech is normal and behavior is normal. Judgment and thought content normal. Her mood appears not anxious. Her affect is not angry, not blunt, not labile and not inappropriate. Cognition and memory are normal. She does not exhibit a depressed mood. Additional History     Past Medical History:   Diagnosis Date    Acne     Dysmenorrhea     Controlled on OCPs, doing better on desogen    Heart palpitations 7/2/2014    Seen by Dr. Rach Chin, 24 hour monitor didn't catch much, but pt noting poor communication/instructions and then equipment malfunction    Lipoma of neck 7/2/2014    Small (~2 cm) and nontender     Ovarian cyst     Hx of cyst, pain with rupture     History reviewed. No pertinent surgical history. Social History     Tobacco Use    Smoking status: Never Smoker    Smokeless tobacco: Never Used   Substance Use Topics    Alcohol use: No    Drug use: No     Current Outpatient Medications   Medication Sig    doxycycline (VIBRAMYCIN) 100 mg capsule Take 1 Cap by mouth two (2) times a day for 10 days.  ALPRAZolam (XANAX) 0.25 mg tablet Take one tablet as needed for palpitations/heart racing that lasts > 3 minutes. Max 2 tablets in 24 hours.  metoprolol succinate (TOPROL-XL) 25 mg XL tablet Take 1 Tab by mouth daily.  drospirenone-ethinyl estradiol (LORYNA, 28,) 3-0.02 mg tab Take  by mouth daily. No current facility-administered medications for this visit. Allergies   Allergen Reactions    Sulfa (Sulfonamide Antibiotics) Rash     Hives       Encounter Diagnoses:     Encounter Diagnoses     ICD-10-CM ICD-9-CM   1. Heart palpitations R00.2 785.1   2. Abnormal vascular flow R09.89 796.4   3. Chest pain on exertion R07.9 786.50   4. Paresthesia of left arm R20.2 782.0   5. Black tarry stools K92.1 578.1   6. Abdominal pain, unspecified abdominal location R10.9 789.00   7. Heartburn R12 787.1   8. Chest tightness R07.89 786.59   9. Temperature intolerance R68.89 780.99   10. Tachycardia R00.0 785.0   11. Acute non-recurrent maxillary sinusitis J01.00 461.0   12. Anxiety attack F41.0 300.01   13. Intermittent palpitations R00.2 785. 1            This document may have been created with the aid of dictation software.   Text may contain errors, particularly phonetic errors

## 2019-03-01 LAB
A-G RATIO,AGRAT: 1.9 RATIO (ref 1.1–2.6)
ABSOLUTE LYMPHOCYTE COUNT, 10803: 1.1 K/UL (ref 1–4.8)
ALBUMIN SERPL-MCNC: 5.2 G/DL (ref 3.5–5)
ALP SERPL-CCNC: 92 U/L (ref 25–115)
ALT SERPL-CCNC: 25 U/L (ref 5–40)
ANION GAP SERPL CALC-SCNC: 25 MMOL/L
AST SERPL W P-5'-P-CCNC: 20 U/L (ref 10–37)
BASOPHILS # BLD: 0 K/UL (ref 0–0.2)
BASOPHILS NFR BLD: 1 % (ref 0–2)
BILIRUB SERPL-MCNC: 0.2 MG/DL (ref 0.2–1.2)
BUN SERPL-MCNC: 9 MG/DL (ref 6–22)
CALCIUM SERPL-MCNC: 10.1 MG/DL (ref 8.4–10.5)
CHLORIDE SERPL-SCNC: 97 MMOL/L (ref 98–110)
CO2 SERPL-SCNC: 19 MMOL/L (ref 20–32)
CREAT SERPL-MCNC: 0.9 MG/DL (ref 0.5–1.2)
D-DIMER(G): 0.37 MG/L FEU (ref 0–1.12)
EOSINOPHIL # BLD: 0 K/UL (ref 0–0.5)
EOSINOPHIL NFR BLD: 1 % (ref 0–6)
ERYTHROCYTE [DISTWIDTH] IN BLOOD BY AUTOMATED COUNT: 12.8 % (ref 10–15.5)
GFRAA, 66117: >60
GFRNA, 66118: >60
GLOBULIN,GLOB: 2.8 G/DL (ref 2–4)
GLUCOSE SERPL-MCNC: 100 MG/DL (ref 70–99)
GRANULOCYTES,GRANS: 68 % (ref 40–75)
HCT VFR BLD AUTO: 43.4 % (ref 35.1–46.5)
HGB BLD-MCNC: 14.1 G/DL (ref 11.7–15.5)
IRON,IRN: 26 MCG/DL (ref 30–160)
LYMPHOCYTES, LYMLT: 19 % (ref 20–45)
MCH RBC QN AUTO: 28 PG (ref 26–34)
MCHC RBC AUTO-ENTMCNC: 33 G/DL (ref 31–36)
MCV RBC AUTO: 87 FL (ref 80–95)
METANEPHRINES,PLASMA, 10832: 44 PG/ML (ref 0–62)
MONOCYTES # BLD: 0.7 K/UL (ref 0.1–1)
MONOCYTES NFR BLD: 12 % (ref 3–12)
NEUTROPHILS # BLD AUTO: 4.1 K/UL (ref 1.8–7.7)
NORMETANEPHRINE, PL, 070013: 157 PG/ML (ref 0–145)
PLATELET # BLD AUTO: 277 K/UL (ref 140–440)
PMV BLD AUTO: 10.3 FL (ref 9–13)
POTASSIUM SERPL-SCNC: 3.8 MMOL/L (ref 3.5–5.5)
PROT SERPL-MCNC: 8 G/DL (ref 6.4–8.3)
RBC # BLD AUTO: 4.97 M/UL (ref 3.8–5.2)
SODIUM SERPL-SCNC: 141 MMOL/L (ref 133–145)
T4 FREE SERPL-MCNC: 1.4 NG/DL (ref 0.9–1.8)
WBC # BLD AUTO: 6 K/UL (ref 4–11)

## 2019-03-11 ENCOUNTER — OFFICE VISIT (OUTPATIENT)
Dept: CARDIOLOGY CLINIC | Age: 24
End: 2019-03-11

## 2019-03-11 VITALS
WEIGHT: 161 LBS | SYSTOLIC BLOOD PRESSURE: 113 MMHG | DIASTOLIC BLOOD PRESSURE: 70 MMHG | OXYGEN SATURATION: 96 % | BODY MASS INDEX: 27.64 KG/M2 | HEART RATE: 78 BPM

## 2019-03-11 DIAGNOSIS — R00.2 PALPITATIONS: Primary | ICD-10-CM

## 2019-03-11 DIAGNOSIS — R00.0 TACHYCARDIA: ICD-10-CM

## 2019-03-11 NOTE — PROGRESS NOTES
[facility]    Ms. Merlin Sanderson is a 60-year-old female with no significant past medical history of myocardial infarction or congestive heart failure. She is here today to establish care with me. For last 6-10 years she has been expensing on and off palpitation. She has been seen by multiple cardiologist and her workup has been unremarkable. She remembers that she has been having this palpitation initially only when she was exerting herself in a younger age and now this is happening more frequent even sometimes with change of position. This palpitation occasionally is associated with dizzy sensation. She has never passed out however she occasionally feels like she is about to pass out. She denies any exertional chest tightness or chest pressure. She denies any PND or lower extremity swelling. She was seen by primary care provider and because of her tachycardia she was started on metoprolol and since then she feels like her palpitation is much better. Denies any nausea, vomiting, abdominal pain, fever, chills, sputum production. No hematuria or other bleeding complaints    Past Medical History:   Diagnosis Date    Acne     Dysmenorrhea     Controlled on OCPs, doing better on desogen    Heart palpitations 7/2/2014    Seen by Dr. Jimmy Pineda, 24 hour monitor didn't catch much, but pt noting poor communication/instructions and then equipment malfunction    Lipoma of neck 7/2/2014    Small (~2 cm) and nontender     Ovarian cyst     Hx of cyst, pain with rupture         No past surgical history on file. Current Outpatient Medications   Medication Sig    ALPRAZolam (XANAX) 0.25 mg tablet Take one tablet as needed for palpitations/heart racing that lasts > 3 minutes. Max 2 tablets in 24 hours.  metoprolol succinate (TOPROL-XL) 25 mg XL tablet Take 1 Tab by mouth daily.     drospirenone-ethinyl estradiol (LORYNA, 28,) 3-0.02 mg tab Take  by mouth daily. No current facility-administered medications for this visit. Allergies and Sensitivities:  Allergies   Allergen Reactions    Sulfa (Sulfonamide Antibiotics) Rash     Hives       Family History:  Family History   Problem Relation Age of Onset    Heart Disease Paternal Grandfather        Social History:  Social History     Tobacco Use    Smoking status: Never Smoker    Smokeless tobacco: Never Used   Substance Use Topics    Alcohol use: No    Drug use: No     She  reports that  has never smoked. she has never used smokeless tobacco.  She  reports that she does not drink alcohol. Review of Systems:  Cardiac symptoms as noted above in HPI. All others negative. Denies fatigue, malaise, skin rash, joint pain, blurring vision, photophobia, neck pain, hemoptysis, chronic cough, nausea, vomiting, hematuria, burning micturition, BRBPR, chronic headaches. Physical Exam:  BP Readings from Last 3 Encounters:   03/11/19 113/70   02/26/19 134/86   08/03/18 110/80         Pulse Readings from Last 3 Encounters:   03/11/19 78   02/26/19 (!) 113   08/03/18 100          Wt Readings from Last 3 Encounters:   03/11/19 161 lb (73 kg)   02/26/19 159 lb 9.6 oz (72.4 kg)   08/03/18 165 lb 6.4 oz (75 kg)       Constitutional: Oriented to person, place, and time. HENT: Head: Normocephalic and atraumatic. Eyes: Conjunctivae and extraocular motions are normal.   Neck: No JVD present. Carotid bruit is not appreciated. Cardiovascular: Regular rhythm. No murmur, gallop or rubs appreciated  Lung: Breath sounds normal. No respiratory distress. No ronchi or rales appreciated  Abdominal: No tenderness. No rebound and no guarding. Musculoskeletal: There is no lower extremity edema. No cynosis  Lymphadenopathy:  No cervical or supraclavicular adenopathy appriciated. Neurological: No gross motor deficit noted. Skin: No visible skin rash noted. No Ear discharge noted  Psychiatric: Normal mood and affect. Good distal pulse    Review of Data  LABS:   Lab Results   Component Value Date/Time    Sodium 141 02/26/2019 03:07 PM    Potassium 3.8 02/26/2019 03:07 PM    Chloride 97 (L) 02/26/2019 03:07 PM    CO2 19 (L) 02/26/2019 03:07 PM    Glucose 100 (H) 02/26/2019 03:07 PM    BUN 9 02/26/2019 03:07 PM    Creatinine 0.9 02/26/2019 03:07 PM     Lipids Latest Ref Rng & Units 8/3/2018   Chol, Total 110 - 200 mg/dL 178   HDL 40 - 59 mg/dL 62(H)   LDL 50 - 99 mg/dL 104(H)   Trig 40 - 149 mg/dL 60   Some recent data might be hidden     Lab Results   Component Value Date/Time    ALT (SGPT) 25 02/26/2019 03:07 PM     Lab Results   Component Value Date/Time    Hemoglobin A1c 5.3 08/03/2018 07:30 PM       EKG  (2/26/19) sinus tachycardia at 127 bpm.  Nonspecific T wave changes    ECHO  IMPRESSION & PLAN:  Ms. Alen Peoples is 69-year-old female    In regards to her palpitation, this is most likely related to her documented tachycardias. She is not orthostatic. It is possible that she may have postural tachycardia syndrome, POTS. He already has been started on beta-blocker by primary care provider and her heart rate today 78 bpm.  We discussed about further management and diagnosis including utilization of event monitor, tilt table testing and echocardiogram.  She tells me that she had echocardiogram, stress test and event monitor in the past and that has been unremarkable. She is going to see endocrinologist for workup of pheochromocytoma so she would like to wait for further testing. Her blood pressure is normal.  I offered her to tilt table test and echocardiogram for further workup after temporarily stopping beta-blocker. She would like to wait for another few months before doing any further testing. I have advised her to keep herself well-hydrated and even consider drinking electrolyte supplement like Gatorade in limited amount.   We will see her back in 3 months or sooner if needed    This plan was discussed with patient who is in agreement. Thank you for allowing me to participate in patient care. Please feel free to call me if you have any question or concern. Ryan Joya MD  Please note: This document has been produced using voice recognition software. Unrecognized errors in transcription may be present.

## 2019-03-11 NOTE — PROGRESS NOTES
1. Have you been to the ER, urgent care clinic since your last visit? Hospitalized since your last visit? No     2. Have you seen or consulted any other health care providers outside of the [address] [place] [address] since your last visit? Include any pap smears or colon screening.   Yes

## 2019-03-20 DIAGNOSIS — R00.2 HEART PALPITATIONS: Chronic | ICD-10-CM

## 2019-03-20 DIAGNOSIS — R07.9 CHEST PAIN ON EXERTION: Chronic | ICD-10-CM

## 2019-03-20 DIAGNOSIS — R00.0 TACHYCARDIA: ICD-10-CM

## 2019-03-20 NOTE — TELEPHONE ENCOUNTER
Last ov 2-26-19, with note to follow up in 6 weeks.    Future Appointments   Date Time Provider Nikita Jaclyn   6/18/2019  1:00 PM Bala Marvin MD [address] [place] [address]        Last fill 2-26-19 #30 x0

## 2019-03-21 RX ORDER — METOPROLOL SUCCINATE 25 MG/1
25 TABLET, EXTENDED RELEASE ORAL DAILY
Qty: 30 TAB | Refills: 0 | Status: SHIPPED | OUTPATIENT
Start: 2019-03-21 | End: 2019-04-22 | Stop reason: SDUPTHER

## 2019-04-22 DIAGNOSIS — R00.0 TACHYCARDIA: ICD-10-CM

## 2019-04-22 DIAGNOSIS — R00.2 HEART PALPITATIONS: Chronic | ICD-10-CM

## 2019-04-22 DIAGNOSIS — R07.9 CHEST PAIN ON EXERTION: Chronic | ICD-10-CM

## 2019-04-25 DIAGNOSIS — R00.2 HEART PALPITATIONS: Chronic | ICD-10-CM

## 2019-04-25 DIAGNOSIS — R00.0 TACHYCARDIA: ICD-10-CM

## 2019-04-25 DIAGNOSIS — R07.9 CHEST PAIN ON EXERTION: Chronic | ICD-10-CM

## 2019-04-26 RX ORDER — METOPROLOL SUCCINATE 25 MG/1
25 TABLET, EXTENDED RELEASE ORAL DAILY
Qty: 30 TAB | Refills: 5 | Status: SHIPPED | OUTPATIENT
Start: 2019-04-26 | End: 2019-07-11 | Stop reason: SDUPTHER

## 2019-04-26 RX ORDER — METOPROLOL SUCCINATE 25 MG/1
TABLET, EXTENDED RELEASE ORAL
Qty: 30 TAB | Refills: 0 | OUTPATIENT
Start: 2019-04-26

## 2019-05-16 ENCOUNTER — TELEPHONE (OUTPATIENT)
Dept: CARDIOLOGY CLINIC | Age: 24
End: 2019-05-16

## 2019-05-16 NOTE — TELEPHONE ENCOUNTER
Called and left message for Gastroenterology office at [phone] to call office to ask what procedure the patient is having done for clearance that was faxed. Office called back and advised that patient if having both colonoscopy and endoscopy. Advised that clearance will be faxed to office today at [phone] and will be scanned into chart.

## 2019-07-11 ENCOUNTER — OFFICE VISIT (OUTPATIENT)
Dept: CARDIOLOGY CLINIC | Age: 24
End: 2019-07-11

## 2019-07-11 VITALS
SYSTOLIC BLOOD PRESSURE: 148 MMHG | HEIGHT: 64 IN | HEART RATE: 100 BPM | WEIGHT: 165 LBS | BODY MASS INDEX: 28.17 KG/M2 | DIASTOLIC BLOOD PRESSURE: 93 MMHG | OXYGEN SATURATION: 96 %

## 2019-07-11 DIAGNOSIS — R07.9 CHEST PAIN ON EXERTION: Chronic | ICD-10-CM

## 2019-07-11 DIAGNOSIS — I47.1 SVT (SUPRAVENTRICULAR TACHYCARDIA) (HCC): Primary | ICD-10-CM

## 2019-07-11 DIAGNOSIS — R00.0 TACHYCARDIA: ICD-10-CM

## 2019-07-11 DIAGNOSIS — R00.2 HEART PALPITATIONS: Chronic | ICD-10-CM

## 2019-07-11 RX ORDER — METOPROLOL SUCCINATE 25 MG/1
25 TABLET, EXTENDED RELEASE ORAL 2 TIMES DAILY
Qty: 60 TAB | Refills: 3 | Status: SHIPPED | OUTPATIENT
Start: 2019-07-11 | End: 2019-08-05

## 2019-07-11 NOTE — PROGRESS NOTES
[facility]      Ms. Corin Alston is a 63-year-old female with no significant past medical history of myocardial infarction or congestive heart failure. She is here today for follow-up appointment. Since last time, she had at least 10 episode of palpitation, sudden onset which has been associated with dyspnea, chest pressure, dizziness. She also gets nauseous and sweaty along with this episode. She has apple iWatch and has documented her heart rate to be as high as 190-210 bpm.  This episode started randomly and resolves itself. Past Medical History:   Diagnosis Date    Acne     Dysmenorrhea     Controlled on OCPs, doing better on desogen    Heart palpitations 07/02/2014    Lipoma of neck 7/2/2014    Small (~2 cm) and nontender     Ovarian cyst     Hx of cyst, pain with rupture         No past surgical history on file. Current Outpatient Medications   Medication Sig    metoprolol succinate (TOPROL-XL) 25 mg XL tablet Take 1 Tab by mouth daily.  ALPRAZolam (XANAX) 0.25 mg tablet Take one tablet as needed for palpitations/heart racing that lasts > 3 minutes. Max 2 tablets in 24 hours.  drospirenone-ethinyl estradiol (LORYNA, 28,) 3-0.02 mg tab Take  by mouth daily. No current facility-administered medications for this visit. Allergies and Sensitivities:  Allergies   Allergen Reactions    Sulfa (Sulfonamide Antibiotics) Rash     Hives       Family History:  Family History   Problem Relation Age of Onset    Heart Disease Paternal Grandfather        Social History:  Social History     Tobacco Use    Smoking status: Never Smoker    Smokeless tobacco: Never Used   Substance Use Topics    Alcohol use: No    Drug use: No     She  reports that she has never smoked. She has never used smokeless tobacco.  She  reports that she does not drink alcohol. Review of Systems:  Cardiac symptoms as noted above in HPI.  All others negative. Denies fatigue, malaise, skin rash, joint pain, blurring vision, photophobia, neck pain, hemoptysis, chronic cough, nausea, vomiting, hematuria, burning micturition, BRBPR, chronic headaches. Physical Exam:  BP Readings from Last 3 Encounters:   07/11/19 (!) 148/93   03/11/19 113/70   02/26/19 134/86         Pulse Readings from Last 3 Encounters:   07/11/19 100   03/11/19 78   02/26/19 (!) 113          Wt Readings from Last 3 Encounters:   07/11/19 165 lb (74.8 kg)   03/11/19 161 lb (73 kg)   02/26/19 159 lb 9.6 oz (72.4 kg)       Constitutional: Oriented to person, place, and time. HENT: Head: Normocephalic and atraumatic. Eyes: Conjunctivae and extraocular motions are normal.   Neck: No JVD present. Carotid bruit is not appreciated. Cardiovascular: Regular rhythm. No murmur, gallop or rubs appreciated  Lung: Breath sounds normal. No respiratory distress. No ronchi or rales appreciated  Abdominal: No tenderness. No rebound and no guarding. Musculoskeletal: There is no lower extremity edema. No cynosis  Lymphadenopathy:  No cervical or supraclavicular adenopathy appriciated. Neurological: No gross motor deficit noted. Skin: No visible skin rash noted. No Ear discharge noted  Psychiatric: Normal mood and affect.    Good distal pulse    Review of Data  LABS:   Lab Results   Component Value Date/Time    Sodium 141 02/26/2019 03:07 PM    Potassium 3.8 02/26/2019 03:07 PM    Chloride 97 (L) 02/26/2019 03:07 PM    CO2 19 (L) 02/26/2019 03:07 PM    Glucose 100 (H) 02/26/2019 03:07 PM    BUN 9 02/26/2019 03:07 PM    Creatinine 0.9 02/26/2019 03:07 PM     Lipids Latest Ref Rng & Units 8/3/2018   Chol, Total 110 - 200 mg/dL 178   HDL 40 - 59 mg/dL 62(H)   LDL 50 - 99 mg/dL 104(H)   Trig 40 - 149 mg/dL 60   Some recent data might be hidden     Lab Results   Component Value Date/Time    ALT (SGPT) 25 02/26/2019 03:07 PM     Lab Results   Component Value Date/Time    Hemoglobin A1c 5.3 08/03/2018 07:30 PM       EKG  (2/26/19) sinus tachycardia at 127 bpm.  Nonspecific T wave changes    ECHO  IMPRESSION & PLAN:  Ms. Nicole Roy is 25 y.o. female    Tachycardia:  Patient has apple iWatch and she has noted multiple episode of tachycardia ranging from 190-210 bpm which are symptomatic. There are narrow complex tachycardia. This is most likely paroxysmal supraventricular tachycardia unless proven otherwise. I have discussed the diagnosis of SVT and management with patient and the family member in detail. I am going to increase her Toprol dose to 25 mg twice daily. We discussed role of electrophysiology study and potential ablation treatment. The patient and the mother is interested in electrophysiology study. Risk benefits alternatives and complication of the procedure were discussed with the patient in detail. They verbalizes to understand. I am going to schedule this with Dr. Cyndi Martinez. Following are the tracing from apple iWatch brought by patient. I will also ask my nursing staff to scan this in patient's chart                This plan was discussed with patient who is in agreement. Thank you for allowing me to participate in patient care. Please feel free to call me if you have any question or concern. Guille Mauro MD  Please note: This document has been produced using voice recognition software. Unrecognized errors in transcription may be present.

## 2019-07-11 NOTE — PATIENT INSTRUCTIONS
Increase toprol xl 25 mg to twice a day     Mahinice will call to schedule your testing within 48 hours.      If you do not hear from her, then please call central scheduling at [phone] or [phone] Dangelo Sosa    All testing/lab work is completed at [facility] [address], [facility] [address]    * Call our office 48 hrs after testing for results*

## 2019-07-11 NOTE — PROGRESS NOTES
1. Have you been to the ER, urgent care clinic since your last visit? Hospitalized since your last visit? No    2. Have you seen or consulted any other health care providers outside of the [address] [place] [address] since your last visit? Include any pap smears or colon screening.  No

## 2019-07-15 ENCOUNTER — TELEPHONE (OUTPATIENT)
Dept: CARDIOLOGY CLINIC | Age: 24
End: 2019-07-15

## 2019-07-15 NOTE — TELEPHONE ENCOUNTER
Dr. Francisco Rico patient is scheduled for an EP Study/Possible SVT Ablation on 8/5/19 @ 9:15am at [facility]. Jade or Aria could you please contact patient with her instructions. Aleksandra Diaz  Female, 25 y.o., 1995  Weight:   74.8 kg (165 lb)  MRN:   [id number]  Phone:   [phone] (H) . .. PCP:   Brent Martinez MD  Primary Cvg:   [employer]  Last Appt With Me  Next Appt With Me  None  Next Appt  1/16/20 - Aysha Harry MD [facility]   Message   Received: Today   Message Contents   Deanne Garcia MD Thedford Haggard, MD; Brook Snyder, agree with EP study, iwatch very cool! Marianela Aly, can you please schedule for ep study, possible svt ablation?    Loni Kingston    Previous Messages      ----- Message -----   From: Aysha Harry MD   Sent: 7/11/2019   2:03 PM   To: Abdulaziz James MD     One Zahra Asencio   This young female has documented tachycardia.  She has apple iWatch and her heart rate goes as high as 210 bpm, symptomatic   To me seems like SVT episodes. Dano Ped is on Toprol and I am increasing the dose   I have convinced her to undergo EP study and possible ablation with you.  Her iWatch data should also be scanned in the chart for you to review.  If you agree, can you please have your nurse call her and schedule EP study if needed

## 2019-07-18 ENCOUNTER — DOCUMENTATION ONLY (OUTPATIENT)
Dept: CARDIOLOGY CLINIC | Age: 24
End: 2019-07-18

## 2019-07-18 NOTE — PROGRESS NOTES
Faxed optim[employer] auth form and notes to obtain for EP study and SVT ablation.  With Dr. Cyndi Martinez at [facility]     7/18/2019

## 2019-07-23 ENCOUNTER — TELEPHONE (OUTPATIENT)
Dept: CARDIOLOGY CLINIC | Age: 24
End: 2019-07-23

## 2019-07-23 DIAGNOSIS — I47.1 PAROXYSMAL SUPRAVENTRICULAR TACHYCARDIA (HCC): Primary | ICD-10-CM

## 2019-07-23 DIAGNOSIS — R07.9 CHEST PAIN ON EXERTION: ICD-10-CM

## 2019-07-23 NOTE — TELEPHONE ENCOUNTER
[facility]          Patient  EP Instructions                  1. You are scheduled to have a EP Study with Possible SVT Ablation on  August 5, 2019 , at 0915 am.    Please check in at 0800 am.    2. Please go to DR[facility] and linda in the outpatient parking lot that is located around to the back of the hospital and enter through the [employer]. Once you enter through the [facility] check in with the  there. The  will either give you directions or assist you in getting to the cath holding area. 3.  You are not to eat or drink anything after midnight the night before your                   procedure. 4. Please continue to take your medications with a small sip of water on the morning of the procedure with the following exceptions:  Hold  toprol 48 hours prior              . 5. If you are diabetic, do not take your insulin/sugar pill the morning of the procedure. 6. We encourage families to wait in the waiting room on the first floor while the procedure is being done. The Doctor will come out and talk with you as soon as the procedure is over. 7. There is the possibility that you may spend the night in the hospital, depending on the results of the procedure. This will be determined after the procedure is done. 8.   If you or your family have any questions, please call our office Monday-Friday 9:00am         -4:30 pm , at [phone], and ask to speak to one of the nurses.

## 2019-07-23 NOTE — TELEPHONE ENCOUNTER
Patient is aware of instructions and will go to have labs and cxr next week. Patient is very nervous about procedure.

## 2019-07-29 ENCOUNTER — HOSPITAL ENCOUNTER (OUTPATIENT)
Dept: LAB | Age: 24
Discharge: HOME | End: 2019-07-29

## 2019-07-29 ENCOUNTER — HOSPITAL ENCOUNTER (OUTPATIENT)
Dept: NON INVASIVE DIAGNOSTICS | Age: 24
Discharge: HOME | End: 2019-07-29
Attending: INTERNAL MEDICINE
Payer: COMMERCIAL

## 2019-07-29 ENCOUNTER — HOSPITAL ENCOUNTER (OUTPATIENT)
Dept: GENERAL RADIOLOGY | Age: 24
Discharge: HOME | End: 2019-07-29
Attending: INTERNAL MEDICINE
Payer: COMMERCIAL

## 2019-07-29 VITALS
SYSTOLIC BLOOD PRESSURE: 148 MMHG | WEIGHT: 165 LBS | HEIGHT: 64 IN | DIASTOLIC BLOOD PRESSURE: 93 MMHG | BODY MASS INDEX: 28.17 KG/M2

## 2019-07-29 DIAGNOSIS — I47.1 PAROXYSMAL SUPRAVENTRICULAR TACHYCARDIA (HCC): ICD-10-CM

## 2019-07-29 DIAGNOSIS — R07.9 CHEST PAIN ON EXERTION: ICD-10-CM

## 2019-07-29 DIAGNOSIS — I47.1 SVT (SUPRAVENTRICULAR TACHYCARDIA) (HCC): ICD-10-CM

## 2019-07-29 LAB — SENTARA SPECIMEN COL,SENBCF: NORMAL

## 2019-07-29 PROCEDURE — 93306 TTE W/DOPPLER COMPLETE: CPT

## 2019-07-29 PROCEDURE — 99001 SPECIMEN HANDLING PT-LAB: CPT

## 2019-07-29 PROCEDURE — 71046 X-RAY EXAM CHEST 2 VIEWS: CPT

## 2019-07-30 LAB
A-G RATIO,AGRAT: 2 RATIO (ref 1.1–2.6)
ABSOLUTE LYMPHOCYTE COUNT, 10803: 1.5 K/UL (ref 1–4.8)
ALBUMIN SERPL-MCNC: 4.6 G/DL (ref 3.5–5)
ALP SERPL-CCNC: 69 U/L (ref 25–115)
ALT SERPL-CCNC: 12 U/L (ref 5–40)
ANION GAP SERPL CALC-SCNC: 12 MMOL/L
AST SERPL W P-5'-P-CCNC: 15 U/L (ref 10–37)
BASOPHILS # BLD: 0 K/UL (ref 0–0.2)
BASOPHILS NFR BLD: 0 % (ref 0–2)
BILIRUB SERPL-MCNC: 0.3 MG/DL (ref 0.2–1.2)
BUN SERPL-MCNC: 9 MG/DL (ref 6–22)
CALCIUM SERPL-MCNC: 9.5 MG/DL (ref 8.4–10.5)
CHLORIDE SERPL-SCNC: 102 MMOL/L (ref 98–110)
CO2 SERPL-SCNC: 25 MMOL/L (ref 20–32)
CREAT SERPL-MCNC: 0.7 MG/DL (ref 0.5–1.2)
EOSINOPHIL # BLD: 0.1 K/UL (ref 0–0.5)
EOSINOPHIL NFR BLD: 2 % (ref 0–6)
ERYTHROCYTE [DISTWIDTH] IN BLOOD BY AUTOMATED COUNT: 12.2 % (ref 10–15.5)
GFRAA, 66117: >60
GFRNA, 66118: >60
GLOBULIN,GLOB: 2.3 G/DL (ref 2–4)
GLUCOSE SERPL-MCNC: 84 MG/DL (ref 70–99)
GRANULOCYTES,GRANS: 62 % (ref 40–75)
HCT VFR BLD AUTO: 38.8 % (ref 35.1–46.5)
HGB BLD-MCNC: 12.6 G/DL (ref 11.7–15.5)
INR PPP: 0.91 (ref 0.89–1.29)
LYMPHOCYTES, LYMLT: 27 % (ref 20–45)
MCH RBC QN AUTO: 29 PG (ref 26–34)
MCHC RBC AUTO-ENTMCNC: 33 G/DL (ref 31–36)
MCV RBC AUTO: 88 FL (ref 80–95)
MONOCYTES # BLD: 0.5 K/UL (ref 0.1–1)
MONOCYTES NFR BLD: 8 % (ref 3–12)
NEUTROPHILS # BLD AUTO: 3.6 K/UL (ref 1.8–7.7)
PLATELET # BLD AUTO: 274 K/UL (ref 140–440)
PMV BLD AUTO: 10.3 FL (ref 9–13)
POTASSIUM SERPL-SCNC: 4.2 MMOL/L (ref 3.5–5.5)
PROT SERPL-MCNC: 6.9 G/DL (ref 6.4–8.3)
PROTHROMBIN TIME: 9.7 SEC (ref 9–13)
RBC # BLD AUTO: 4.4 M/UL (ref 3.8–5.2)
SODIUM SERPL-SCNC: 139 MMOL/L (ref 133–145)
WBC # BLD AUTO: 5.8 K/UL (ref 4–11)

## 2019-08-02 ENCOUNTER — TELEPHONE (OUTPATIENT)
Dept: CARDIOLOGY CLINIC | Age: 24
End: 2019-08-02

## 2019-08-02 DIAGNOSIS — R00.2 PALPITATIONS: ICD-10-CM

## 2019-08-02 DIAGNOSIS — R00.2 PALPITATIONS: Primary | ICD-10-CM

## 2019-08-02 RX ORDER — DIAZEPAM 2 MG/1
TABLET ORAL
Qty: 3 TAB | Refills: 0 | Status: SHIPPED | OUTPATIENT
Start: 2019-08-02 | End: 2019-08-02 | Stop reason: SDUPTHER

## 2019-08-02 RX ORDER — DIAZEPAM 2 MG/1
TABLET ORAL
Qty: 3 TAB | Refills: 0 | Status: SHIPPED | OUTPATIENT
Start: 2019-08-02 | End: 2019-10-22 | Stop reason: ALTCHOICE

## 2019-08-02 RX ORDER — DIAZEPAM 2 MG/1
TABLET ORAL
Qty: 3 TAB | Refills: 0 | Status: CANCELLED | OUTPATIENT
Start: 2019-08-02

## 2019-08-02 NOTE — H&P
H&P Note    Referral by Dr. Gomez Torres for EP study, possible ablation  Date of Encounter:  8/5/19  Primary Care Physician:  Logan Vargas MD     Assessment:     -Recurrent palpitations, sudden onset, associated with dyspnea, dizziness. Apple watch with documented SVT up to 190-210 bpm, reviewed personally and documented by Dr. Barnhart Lack note.  -Echo 7/29/19, EF 61%, structurally normal heart    Primary cardiologist Dr. Elise Gonzalez:     Plan EP study with possible right sided SVT ablation. Patient/family have opted for definitive diagnosis and therapy. If left sided, I discussed stopping procedure and discussing risks/benefits. Pertinent risks, benefits, alternatives discussed. Risks include emergent intubation as well as possibly inability to intubate. Risks include but are not limited to acute and chronic pain, infection, bleeding, deep venous thrombosis with chronic swelling of extremity, pulmonary embolism, anesthesia reactions, intubation,  pneumothorax, hemothorax, perforation of the heart muscle/chambers, emergent open heart surgery with bypass/valve replacement, pacemaker, pacemaker (quoted 1:500), AICD, incessant VT, valve damage, nerve damage, and death. There can be a prolonged hospitalization with brain damage and reduced or compromised long term mobility. By stating these are possible risks, this does not exclude the potential for additional risks not named here. History of Present Illness: This is a 25 y.o. female admitted for Paroxysmal supraventricular tachycardia ([address] [I47.1]. Patient complains of:    Recurrent palpitations documented by apple watch up to 210 bpm.  Patient elected for definitive diagnosis and treatment with EP study.     Review of Symptoms:  Except as stated above include:  Constitutional:  Negative  Ears, nose, throat:  Negative  Respiratory:  negative  Cardiovascular:  palpitations  Gastrointestinal: negative  Genitourinary: negative  Musculoskeletal:  Negative  Neurological:  Negative  Dermatological:  Negative  Hematological: Negative  Endocrinological: Negative  Allergy: Negative  Psychological:  Negative     Past Medical History:     Past Medical History:   Diagnosis Date    Acne     Dysmenorrhea     Controlled on OCPs, doing better on desogen    Heart palpitations 07/02/2014    Lipoma of neck 7/2/2014    Small (~2 cm) and nontender     Ovarian cyst     Hx of cyst, pain with rupture         Social History:     Social History     Socioeconomic History    Marital status: SINGLE     Spouse name: Not on file    Number of children: Not on file    Years of education: Not on file    Highest education level: Not on file   Tobacco Use    Smoking status: Never Smoker    Smokeless tobacco: Never Used   Substance and Sexual Activity    Alcohol use: No    Drug use: No   Social History Narrative    12/19/2014:  Family moved from [place], [place] to [facility] in 2013. 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          Family History:     Family History   Problem Relation Age of Onset    Heart Disease Paternal Grandfather         Medications: Allergies   Allergen Reactions    Sulfa (Sulfonamide Antibiotics) Rash     Hives        No current facility-administered medications for this encounter. Current Outpatient Medications   Medication Sig    metoprolol succinate (TOPROL-XL) 25 mg XL tablet Take 1 Tab by mouth two (2) times a day.  ALPRAZolam (XANAX) 0.25 mg tablet Take one tablet as needed for palpitations/heart racing that lasts > 3 minutes. Max 2 tablets in 24 hours.  drospirenone-ethinyl estradiol (LORYNA, 28,) 3-0.02 mg tab Take  by mouth daily. Physical Exam:   There were no vitals taken for this visit.   BP Readings from Last 3 Encounters:   07/29/19 (!) 148/93   07/11/19 (!) 148/93   03/11/19 113/70     Pulse Readings from Last 3 Encounters:   07/11/19 100   03/11/19 78   02/26/19 (!) 113     Wt Readings from Last 3 Encounters:   07/29/19 74.8 kg (165 lb)   07/11/19 74.8 kg (165 lb)   03/11/19 73 kg (161 lb)       General:  alert, cooperative, no distress, appears stated age  Neck:  nontender  Lungs:  clear to auscultation bilaterally  Heart:  regular rate and rhythm, S1, S2 normal, no murmur, click, rub or gallop  Abdomen:  abdomen is soft without significant tenderness, masses, organomegaly or guarding  Extremities:  extremities normal, atraumatic, no cyanosis or edema  Skin: Warm and dry. no hyperpigmentation, vitiligo, or suspicious lesions  Neuro: alert, oriented x3, affect appropriate, no focal neurological deficits, moves all extremities well, no involuntary movements, reflexes at knee and ankle intact  Psych: non focal     Data Review:     No results for input(s): WBC, HGB, HCT, PLT, HGBEXT, HCTEXT, PLTEXT in the last 72 hours. No results for input(s): NA, K, CL, CO2, GLU, BUN, CREA, CA, MG, PHOS, ALB, TBIL, SGOT, ALT, INR in the last 72 hours. No lab exists for component:  BNP, INREXT    No results found for this or any previous visit.     All Cardiac Markers in the last 24 hours:  No results found for: CPK, CK, CKMMB, CKMB, RCK3, CKMBT, CKNDX, CKND1, DORINA, TROPT, TROIQ, INA, TROPT, TNIPOC, BNP, BNPP    Last Lipid:    Lab Results   Component Value Date/Time    Cholesterol, total 178 08/03/2018 07:30 PM    HDL Cholesterol 62 (H) 08/03/2018 07:30 PM    LDL, calculated 104 (H) 08/03/2018 07:30 PM    Triglyceride 60 08/03/2018 07:30 PM       Signed By: Beck Huffman MD     August 2, 2019

## 2019-08-02 NOTE — TELEPHONE ENCOUNTER
Verbal order and read back per Ashlee Cast MD  Lowest dose valium 2 mg one tablet one hour prior to procedure

## 2019-08-05 ENCOUNTER — ANESTHESIA (OUTPATIENT)
Dept: CARDIAC CATH/INVASIVE PROCEDURES | Age: 24
End: 2019-08-05
Payer: COMMERCIAL

## 2019-08-05 ENCOUNTER — HOSPITAL ENCOUNTER (OUTPATIENT)
Age: 24
Setting detail: OUTPATIENT SURGERY
Discharge: HOME | End: 2019-08-05
Attending: INTERNAL MEDICINE | Admitting: INTERNAL MEDICINE
Payer: COMMERCIAL

## 2019-08-05 ENCOUNTER — ANESTHESIA EVENT (OUTPATIENT)
Dept: CARDIAC CATH/INVASIVE PROCEDURES | Age: 24
End: 2019-08-05
Payer: COMMERCIAL

## 2019-08-05 VITALS
WEIGHT: 160 LBS | BODY MASS INDEX: 27.46 KG/M2 | SYSTOLIC BLOOD PRESSURE: 130 MMHG | HEART RATE: 86 BPM | TEMPERATURE: 98.2 F | OXYGEN SATURATION: 100 % | RESPIRATION RATE: 16 BRPM | DIASTOLIC BLOOD PRESSURE: 74 MMHG

## 2019-08-05 DIAGNOSIS — I47.1 PAROXYSMAL SUPRAVENTRICULAR TACHYCARDIA (HCC): ICD-10-CM

## 2019-08-05 LAB — HCG UR QL: NEGATIVE

## 2019-08-05 PROCEDURE — 93653 COMPRE EP EVAL TX SVT: CPT | Performed by: INTERNAL MEDICINE

## 2019-08-05 PROCEDURE — C1732 CATH, EP, DIAG/ABL, 3D/VECT: HCPCS | Performed by: INTERNAL MEDICINE

## 2019-08-05 PROCEDURE — 77030018729 HC ELECTRD DEFIB PAD CARD -B: Performed by: INTERNAL MEDICINE

## 2019-08-05 PROCEDURE — 81025 URINE PREGNANCY TEST: CPT

## 2019-08-05 PROCEDURE — 93613 INTRACARDIAC EPHYS 3D MAPG: CPT | Performed by: INTERNAL MEDICINE

## 2019-08-05 PROCEDURE — 74011250636 HC RX REV CODE- 250/636

## 2019-08-05 PROCEDURE — 74011250637 HC RX REV CODE- 250/637: Performed by: ANESTHESIOLOGY

## 2019-08-05 PROCEDURE — 93621 COMP EP EVL L PAC&REC C SINS: CPT | Performed by: INTERNAL MEDICINE

## 2019-08-05 PROCEDURE — 74011250636 HC RX REV CODE- 250/636: Performed by: INTERNAL MEDICINE

## 2019-08-05 PROCEDURE — 77030022017 HC DRSG HEMO QCLOT ZMED -A: Performed by: INTERNAL MEDICINE

## 2019-08-05 PROCEDURE — C1730 CATH, EP, 19 OR FEW ELECT: HCPCS | Performed by: INTERNAL MEDICINE

## 2019-08-05 PROCEDURE — C1894 INTRO/SHEATH, NON-LASER: HCPCS | Performed by: INTERNAL MEDICINE

## 2019-08-05 PROCEDURE — 76060000033 HC ANESTHESIA 1 TO 1.5 HR: Performed by: INTERNAL MEDICINE

## 2019-08-05 PROCEDURE — 77030027107 HC PTCH EXT REF CARTO3 J&J -F: Performed by: INTERNAL MEDICINE

## 2019-08-05 PROCEDURE — C1887 CATHETER, GUIDING: HCPCS | Performed by: INTERNAL MEDICINE

## 2019-08-05 PROCEDURE — 77030035291 HC TBNG PMP SMARTABLATE J&J -B: Performed by: INTERNAL MEDICINE

## 2019-08-05 RX ORDER — SODIUM CHLORIDE 0.9 % (FLUSH) 0.9 %
5-40 SYRINGE (ML) INJECTION AS NEEDED
Status: DISCONTINUED | OUTPATIENT
Start: 2019-08-05 | End: 2019-08-05 | Stop reason: HOSPADM

## 2019-08-05 RX ORDER — SODIUM CHLORIDE 0.9 % (FLUSH) 0.9 %
5-40 SYRINGE (ML) INJECTION EVERY 8 HOURS
Status: DISCONTINUED | OUTPATIENT
Start: 2019-08-05 | End: 2019-08-05 | Stop reason: HOSPADM

## 2019-08-05 RX ORDER — FENTANYL CITRATE 50 UG/ML
INJECTION, SOLUTION INTRAMUSCULAR; INTRAVENOUS AS NEEDED
Status: DISCONTINUED | OUTPATIENT
Start: 2019-08-05 | End: 2019-08-05 | Stop reason: HOSPADM

## 2019-08-05 RX ORDER — OXYCODONE AND ACETAMINOPHEN 5; 325 MG/1; MG/1
1 TABLET ORAL
Status: DISCONTINUED | OUTPATIENT
Start: 2019-08-05 | End: 2019-08-05 | Stop reason: HOSPADM

## 2019-08-05 RX ORDER — DEXTROSE 50 % IN WATER (D50W) INTRAVENOUS SYRINGE
25-50 AS NEEDED
Status: DISCONTINUED | OUTPATIENT
Start: 2019-08-05 | End: 2019-08-05 | Stop reason: HOSPADM

## 2019-08-05 RX ORDER — OXYCODONE AND ACETAMINOPHEN 5; 325 MG/1; MG/1
1 TABLET ORAL AS NEEDED
Status: DISCONTINUED | OUTPATIENT
Start: 2019-08-05 | End: 2019-08-05 | Stop reason: HOSPADM

## 2019-08-05 RX ORDER — PROPOFOL 10 MG/ML
INJECTION, EMULSION INTRAVENOUS
Status: DISCONTINUED | OUTPATIENT
Start: 2019-08-05 | End: 2019-08-05 | Stop reason: HOSPADM

## 2019-08-05 RX ORDER — MIDAZOLAM HYDROCHLORIDE 1 MG/ML
INJECTION, SOLUTION INTRAMUSCULAR; INTRAVENOUS AS NEEDED
Status: DISCONTINUED | OUTPATIENT
Start: 2019-08-05 | End: 2019-08-05 | Stop reason: HOSPADM

## 2019-08-05 RX ORDER — LIDOCAINE HYDROCHLORIDE 10 MG/ML
INJECTION, SOLUTION EPIDURAL; INFILTRATION; INTRACAUDAL; PERINEURAL AS NEEDED
Status: DISCONTINUED | OUTPATIENT
Start: 2019-08-05 | End: 2019-08-05 | Stop reason: HOSPADM

## 2019-08-05 RX ORDER — PROPOFOL 10 MG/ML
INJECTION, EMULSION INTRAVENOUS AS NEEDED
Status: DISCONTINUED | OUTPATIENT
Start: 2019-08-05 | End: 2019-08-05 | Stop reason: HOSPADM

## 2019-08-05 RX ORDER — SODIUM CHLORIDE 9 MG/ML
INJECTION, SOLUTION INTRAVENOUS
Status: DISCONTINUED | OUTPATIENT
Start: 2019-08-05 | End: 2019-08-05 | Stop reason: HOSPADM

## 2019-08-05 RX ORDER — MAGNESIUM SULFATE 100 %
4 CRYSTALS MISCELLANEOUS AS NEEDED
Status: DISCONTINUED | OUTPATIENT
Start: 2019-08-05 | End: 2019-08-05 | Stop reason: HOSPADM

## 2019-08-05 RX ADMIN — OXYCODONE HYDROCHLORIDE AND ACETAMINOPHEN 1 TABLET: 5; 325 TABLET ORAL at 11:58

## 2019-08-05 RX ADMIN — MIDAZOLAM HYDROCHLORIDE 2 MG: 1 INJECTION, SOLUTION INTRAMUSCULAR; INTRAVENOUS at 09:36

## 2019-08-05 RX ADMIN — FENTANYL CITRATE 25 MCG: 50 INJECTION, SOLUTION INTRAMUSCULAR; INTRAVENOUS at 10:23

## 2019-08-05 RX ADMIN — PROPOFOL 40 MG: 10 INJECTION, EMULSION INTRAVENOUS at 09:47

## 2019-08-05 RX ADMIN — FENTANYL CITRATE 25 MCG: 50 INJECTION, SOLUTION INTRAMUSCULAR; INTRAVENOUS at 09:47

## 2019-08-05 RX ADMIN — SODIUM CHLORIDE: 9 INJECTION, SOLUTION INTRAVENOUS at 09:37

## 2019-08-05 RX ADMIN — PROPOFOL 40 MG: 10 INJECTION, EMULSION INTRAVENOUS at 09:41

## 2019-08-05 RX ADMIN — FENTANYL CITRATE 50 MCG: 50 INJECTION, SOLUTION INTRAMUSCULAR; INTRAVENOUS at 09:39

## 2019-08-05 RX ADMIN — PROPOFOL 40 MG: 10 INJECTION, EMULSION INTRAVENOUS at 09:39

## 2019-08-05 RX ADMIN — PROPOFOL 100 MCG/KG/MIN: 10 INJECTION, EMULSION INTRAVENOUS at 09:39

## 2019-08-05 NOTE — ANESTHESIA POSTPROCEDURE EVALUATION
Procedure(s):  LT ATRIAL PACE & RECORD DURING EP STUDY  ABLATION SVT.     MAC    Anesthesia Post Evaluation      Multimodal analgesia: multimodal analgesia used between 6 hours prior to anesthesia start to PACU discharge  Patient location during evaluation: PACU  Patient participation: complete - patient participated  Level of consciousness: awake and alert  Pain management: adequate  Airway patency: patent  Anesthetic complications: no  Cardiovascular status: acceptable and hemodynamically stable  Respiratory status: acceptable and room air  Hydration status: acceptable  Post anesthesia nausea and vomiting:  none      Vitals Value Taken Time   /71 8/5/2019 10:45 AM   Temp     Pulse 112 8/5/2019 10:45 AM   Resp 18 8/5/2019 10:45 AM   SpO2 100 % 8/5/2019 10:45 AM

## 2019-08-05 NOTE — PROGRESS NOTES
Post Procedure Notes:   9751: Pt returned from procedure. No distress noted. Small hematoma at right dressing site. Pressure being held. Vitals stable. 1100: Dressing to right groin clean, dry, & intact. Pt denies pain. Hematoma gone. 1200: Pt provided w/ meal    1300: I have reviewed discharge instructions with the patient. The patient verbalized understanding. PIV x 2 removed.

## 2019-08-05 NOTE — DISCHARGE INSTRUCTIONS
Disposition:  When discharged to home will need follow-up in the office with Dr. Aditi Tripp. Please call my office at [phone] if any questions or concerns. Restrictions:  No driving for at least 24 hours after surgery. No heavy lifting > 10 lbs or prolonged standing, walking, or running x 1 week. OK to shower today over incision and remove dressing. Monitor groin for any signs of bleeding and please contact office at [phone] if any issues. There may be some mild bruising which is not unusual.  If any new symptoms develop, such as chest pain, dyspnea, dizziness, please contact office at [phone] immediately. ABLATION DISCHARGE INSTRUCTIONS    It is normal to feel tired the first couple days. Take it easy and follow the physicians instructions. CHECK THE CATHETER INSERTION SITE DAILY:  You may shower 24 hours after the procedure, remove the bandage during showering. Wash with soap and water and pat dry. Gentle cleaning of the site with soap and water is sufficient, cover with a dry clean dressing or bandage. Do not apply creams or powders to the area. Do not sit in a bathtub or pool of water for 7 days or until wound has completely healed. Temporary bruising and discomfort is normal and may last a few weeks. You may have a  formation of a small lump at the site which may last up to 6 weeks. CALL THE PHYSICIAN:  If the site becomes red, swollen or feels warm to the touch  If there is bleeding or drainage or if there is unusual pain at the groin or down the leg. If there is any bleeding, lie down, apply pressure or have someone apply pressure with a clean cloth until the bleeding stops. If the bleeding continues, call 911 to be transported to the hospital.  DO NOT DRIVE YOURSELF, [facility] Activity:      For the first 24-48 hours or as instructed by the physician:  No lifting, pushing or pulling over 10 pounds and no straining the insertion site.  Do not life grocery bags or the garbage can, do not run the vacuum  or  for 7 days. Start with short walks as in the hospital and gradually increase as tolerated each day. It is recommended to walk 30 minutes 5-7 days per week. Follow your physicians instructions on activity. Avoid walking outside in extremes of heat or cold. Walk inside when it is cold and windy or hot and humid. Things to keep in mind:  No driving for at least 24 hours, or as designated by your physician. Limit the number of times you go up and down the stairs  Take rests and pace yourself with activity. Be careful and do not strain with bowel movements. Medications: Take all medications as prescribed  Call your physician if you have any questions  Keep an updated list of your medications with you at all times and give a list to your physician and pharmacist    Signs and Symptoms:  Be cautious of symptoms of angina or recurrent symptoms such as chest discomfort, unusual shortness of breath or fatigue, palpitations. After Care: Follow up with your physician as instructed. Follow a heart healthy diet with proper portion control, daily stress management, daily exercise, blood pressure and cholesterol control , and smoking cessation.

## 2019-08-05 NOTE — Clinical Note
Sheath #3: Sheath: inserted. Sheath inserted/placed in the right femoral  vein. Hemostasis achieved.

## 2019-08-05 NOTE — Clinical Note
TRANSFER - IN REPORT:  
 
Verbal report received from: Meng Young RN. Report consisted of patient's Situation, Background, Assessment and  
Recommendations(SBAR). Opportunity for questions and clarification was provided. Assessment completed upon patient's arrival to unit and care assumed. Patient transported with a Cardiac Cath Tech / Patient Care Tech.

## 2019-08-05 NOTE — ANESTHESIA PREPROCEDURE EVALUATION
Relevant Problems   No relevant active problems       Anesthetic History   No history of anesthetic complications            Review of Systems / Medical History  Patient summary reviewed, nursing notes reviewed and pertinent labs reviewed    Pulmonary  Within defined limits                 Neuro/Psych         Psychiatric history (vvvvvvvv anxious)     Cardiovascular            Dysrhythmias : SVT           GI/Hepatic/Renal  Within defined limits              Endo/Other  Within defined limits           Other Findings              Physical Exam    Airway  Mallampati: I  TM Distance: > 6 cm  Neck ROM: normal range of motion   Mouth opening: Normal     Cardiovascular    Rhythm: regular  Rate: normal         Dental  No notable dental hx       Pulmonary  Breath sounds clear to auscultation               Abdominal  Abdominal exam normal       Other Findings            Anesthetic Plan    ASA: 2  Anesthesia type: MAC          Induction: Intravenous  Anesthetic plan and risks discussed with: Patient and Family

## 2019-08-05 NOTE — Clinical Note
Sheath #2: Sheath: inserted. Sheath inserted/placed in the right femoral  vein. Hemostasis achieved.

## 2019-08-05 NOTE — Clinical Note
Sheath #1: Sheath: inserted. Sheath inserted/placed in the right femoral  vein. Hemostasis achieved.

## 2019-08-05 NOTE — PROGRESS NOTES
Cath holding summary     Patient escorted to cath holding from waiting area ambulatory, alert and oriented x 4, voicing no complaints. Changed into gown and placed on monitor. NPO since MN. Lab results, med rec and H&P reviewed on chart. PIV x 2 inserted without difficulty. Family to bedside.

## 2019-08-05 NOTE — Clinical Note
Bilateral groin clipped, prepped with ChloraPrep and draped. Wet prep solution applied at: 946. Wet prep solution dried at: 949. Wet prep elapsed drying time: 3 mins.

## 2019-08-05 NOTE — Clinical Note
TRANSFER - OUT REPORT:  
 
Verbal report given to: Violetta Bliss RN. Report consisted of patient's Situation, Background, Assessment and  
Recommendations(SBAR). Opportunity for questions and clarification was provided. Patient transported with a Cardiac Cath Tech / Patient Care Tech. Patient transported to: [address].

## 2019-08-05 NOTE — H&P
H&P Note     Referral by Dr. Nuvia Vasquez for EP study, possible ablation  Date of Encounter:  8/5/19  Primary Care Physician:  Husam Moore MD      Assessment:      -Recurrent palpitations, sudden onset, associated with dyspnea, dizziness. Apple watch with documented SVT up to 190-210 bpm, reviewed personally and documented by Dr. Wilfrido Chin note.  -Echo 7/29/19, EF 61%, structurally normal heart  -Works as      Primary cardiologist Dr. Dong Cannon:      Plan EP study with possible right sided SVT ablation. Patient/family have opted for definitive diagnosis and therapy. I had a lengthy discussion and reviewed risks/benefits with patient/family. Specifically, I quoted a 1:200-300 chance for pacemaker.     If left sided, I discussed stopping procedure and discussing risks/benefits. I also discussed the possibility of not making a diagnosis and possible inappropriate sinus tachycardia.     Pertinent risks, benefits, alternatives discussed. Risks include emergent intubation as well as possibly inability to intubate.       Risks include but are not limited to acute and chronic pain, infection, bleeding, deep venous thrombosis with chronic swelling of extremity, pulmonary embolism, anesthesia reactions, intubation,  pneumothorax, hemothorax, perforation of the heart muscle/chambers, emergent open heart surgery with bypass/valve replacement, pacemaker, pacemaker (quoted 1:500), AICD, incessant VT, valve damage, nerve damage, and death. There can be a prolonged hospitalization with brain damage and reduced or compromised long term mobility. By stating these are possible risks, this does not exclude the potential for additional risks not named here.      History of Present Illness:      This is a 25 y.o. female admitted for Paroxysmal supraventricular tachycardia ([address] [I47.1].     Patient complains of:    Recurrent palpitations documented by apple watch up to 210 bpm.  Patient elected for definitive diagnosis and treatment with EP study. Competitor swimmer during college.     Review of Symptoms:  Except as stated above include:  Constitutional:  Negative  Ears, nose, throat:  Negative  Respiratory:  negative  Cardiovascular:  palpitations  Gastrointestinal: negative  Genitourinary:  negative  Musculoskeletal:  Negative  Neurological:  Negative  Dermatological:  Negative  Hematological: Negative  Endocrinological: Negative  Allergy: Negative  Psychological:  Negative      Past Medical History:           Past Medical History:   Diagnosis Date    Acne      Dysmenorrhea       Controlled on OCPs, doing better on desogen    Heart palpitations 07/02/2014    Lipoma of neck 7/2/2014     Small (~2 cm) and nontender     Ovarian cyst       Hx of cyst, pain with rupture          Social History:      Social History               Socioeconomic History    Marital status: SINGLE       Spouse name: Not on file    Number of children: Not on file    Years of education: Not on file    Highest education level: Not on file   Tobacco Use    Smoking status: Never Smoker    Smokeless tobacco: Never Used   Substance and Sexual Activity    Alcohol use: No    Drug use: No   Social History Narrative     12/19/2014:  Family moved from [place], [place] to [facility] in 2013. 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               Family History:            Family History   Problem Relation Age of Onset    Heart Disease Paternal Grandfather           Medications:            Allergies   Allergen Reactions    Sulfa (Sulfonamide Antibiotics) Rash       Hives         No current facility-administered medications for this encounter.            Current Outpatient Medications   Medication Sig    metoprolol succinate (TOPROL-XL) 25 mg XL tablet Take 1 Tab by mouth two (2) times a day.     ALPRAZolam (XANAX) 0.25 mg tablet Take one tablet as needed for palpitations/heart racing that lasts > 3 minutes. Max 2 tablets in 24 hours.  drospirenone-ethinyl estradiol (LORYNA, 28,) 3-0.02 mg tab Take  by mouth daily.          Physical Exam:   There were no vitals taken for this visit. BP Readings from Last 3 Encounters:   07/29/19 (!) 148/93   07/11/19 (!) 148/93   03/11/19 113/70          Pulse Readings from Last 3 Encounters:   07/11/19 100   03/11/19 78   02/26/19 (!) 113          Wt Readings from Last 3 Encounters:   07/29/19 74.8 kg (165 lb)   07/11/19 74.8 kg (165 lb)   03/11/19 73 kg (161 lb)         General:  alert, cooperative, no distress, appears stated age  Neck:  nontender  Lungs:  clear to auscultation bilaterally  Heart:  regular rate and rhythm, S1, S2 normal, no murmur, click, rub or gallop  Abdomen:  abdomen is soft without significant tenderness, masses, organomegaly or guarding  Extremities:  extremities normal, atraumatic, no cyanosis or edema  Skin: Warm and dry. no hyperpigmentation, vitiligo, or suspicious lesions  Neuro: alert, oriented x3, affect appropriate, no focal neurological deficits, moves all extremities well, no involuntary movements, reflexes at knee and ankle intact  Psych: non focal      Data Review:      No results for input(s): WBC, HGB, HCT, PLT, HGBEXT, HCTEXT, PLTEXT in the last 72 hours.   No results for input(s): NA, K, CL, CO2, GLU, BUN, CREA, CA, MG, PHOS, ALB, TBIL, SGOT, ALT, INR in the last 72 hours.     No lab exists for component:  BNP, INREXT     No results found for this or any previous visit.     All Cardiac Markers in the last 24 hours:  No results found for: CPK, CK, CKMMB, CKMB, RCK3, CKMBT, CKNDX, CKND1, DORINA, TROPT, TROIQ, INA, TROPT, TNIPOC, BNP, BNPP     Last Lipid:          Lab Results   Component Value Date/Time     Cholesterol, total 178 08/03/2018 07:30 PM     HDL Cholesterol 62 (H) 08/03/2018 07:30 PM     LDL, calculated 104 (H) 08/03/2018 07:30 PM     Triglyceride 60 08/03/2018 07:30 PM

## 2019-10-22 ENCOUNTER — OFFICE VISIT (OUTPATIENT)
Dept: CARDIOLOGY CLINIC | Age: 24
End: 2019-10-22

## 2019-10-22 VITALS
WEIGHT: 159 LBS | OXYGEN SATURATION: 99 % | BODY MASS INDEX: 27.29 KG/M2 | SYSTOLIC BLOOD PRESSURE: 127 MMHG | HEART RATE: 89 BPM | DIASTOLIC BLOOD PRESSURE: 86 MMHG

## 2019-10-22 DIAGNOSIS — I47.1 SVT (SUPRAVENTRICULAR TACHYCARDIA) (HCC): ICD-10-CM

## 2019-10-22 DIAGNOSIS — R00.2 PALPITATIONS: Primary | ICD-10-CM

## 2019-10-22 NOTE — PROGRESS NOTES
1. Have you been to the ER, urgent care clinic since your last visit? Hospitalized since your last visit? No     2. Have you seen or consulted any other health care providers outside of the [address] [place] [address] since your last visit? Include any pap smears or colon screening.   No

## 2019-10-22 NOTE — PROGRESS NOTES
[facility]      Ms. Doll Bence is is 59-year-old female with a history of supraventricular tachycardia status post ablation in July 2019. Patient is here today for follow-up appointment. She has undergone ablation since last visit. Overall her symptoms have essentially resolved. She is feeling much better. She denies any presyncope or syncope. She denies any palpitation. She denies any angina or heart failure symptoms. Past Medical History:   Diagnosis Date    Acne     Dysmenorrhea     Controlled on OCPs, doing better on desogen    Heart palpitations 07/02/2014    Lipoma of neck 7/2/2014    Small (~2 cm) and nontender     Ovarian cyst     Hx of cyst, pain with rupture    SVT (supraventricular tachycardia) ([facility])     S/P -Typical slow pathway AVNRT radiofrequency ablation (07/19)         Past Surgical History:   Procedure Laterality Date    MT COMPRE ELECTROPHYSIOL XM W/LEFT ATRIAL PACNG/REC N/A 8/5/2019    LT ATRIAL PACE & RECORD DURING EP STUDY performed by Ana Cristina Rogers MD at [address] [place] [address] CATH LAB    MT EPHYS EVAL W/ABLATION [address] N/A 8/5/2019    ABLATION SVT performed by Ana Cristina Rogers MD at [address] [place] [address] CATH LAB    MT INTRACARDIAC ELECTROPHYSIOLOGIC 3D MAPPING N/A 8/5/2019    Ep 3d Mapping performed by Ana Cristina Rogers MD at [address] [place] [address] CATH LAB       Current Outpatient Medications   Medication Sig    fluticasone propionate (FLONASE ALLERGY RELIEF NA) by Nasal route.  ALPRAZolam (XANAX) 0.25 mg tablet Take one tablet as needed for palpitations/heart racing that lasts > 3 minutes. Max 2 tablets in 24 hours.  drospirenone-ethinyl estradiol (LORYNA, 28,) 3-0.02 mg tab Take  by mouth daily. No current facility-administered medications for this visit.         Allergies and Sensitivities:  Allergies   Allergen Reactions    Sulfa (Sulfonamide Antibiotics) Rash     Hives       Family History:  Family History   Problem Relation Age of Onset    Heart Disease Paternal Grandfather        Social History:  Social History     Tobacco Use    Smoking status: Never Smoker    Smokeless tobacco: Never Used   Substance Use Topics    Alcohol use: No    Drug use: No     She  reports that she has never smoked. She has never used smokeless tobacco.  She  reports that she does not drink alcohol. Review of Systems:  Cardiac symptoms as noted above in HPI. All others negative. Denies fatigue, malaise, skin rash, joint pain, blurring vision, photophobia, neck pain, hemoptysis, chronic cough, nausea, vomiting, hematuria, burning micturition, BRBPR, chronic headaches. Physical Exam:  BP Readings from Last 3 Encounters:   10/22/19 127/86   08/05/19 130/74   07/29/19 (!) 148/93         Pulse Readings from Last 3 Encounters:   10/22/19 89   08/05/19 86   07/11/19 100          Wt Readings from Last 3 Encounters:   10/22/19 159 lb (72.1 kg)   08/05/19 160 lb (72.6 kg)   07/29/19 165 lb (74.8 kg)       Constitutional: Oriented to person, place, and time. HENT: Head: Normocephalic and atraumatic. Eyes: Conjunctivae and extraocular motions are normal.   Neck: No JVD present. Carotid bruit is not appreciated. Cardiovascular: Regular rhythm. No murmur, gallop or rubs appreciated  Lung: Breath sounds normal. No respiratory distress. No ronchi or rales appreciated  Abdominal: No tenderness. No rebound and no guarding. Musculoskeletal: There is no lower extremity edema.  No cynosis    Review of Data  LABS:   Lab Results   Component Value Date/Time    Sodium 139 07/29/2019 11:09 AM    Potassium 4.2 07/29/2019 11:09 AM    Chloride 102 07/29/2019 11:09 AM    CO2 25 07/29/2019 11:09 AM    Glucose 84 07/29/2019 11:09 AM    BUN 9 07/29/2019 11:09 AM    Creatinine 0.7 07/29/2019 11:09 AM     Lipids Latest Ref Rng & Units 8/3/2018   Chol, Total 110 - 200 mg/dL 178   HDL 40 - 59 mg/dL 62(H)   LDL 50 - 99 mg/dL 104(H) Trig 40 - 149 mg/dL 60   Some recent data might be hidden     Lab Results   Component Value Date/Time    ALT (SGPT) 12 07/29/2019 11:09 AM     Lab Results   Component Value Date/Time    Hemoglobin A1c 5.3 08/03/2018 07:30 PM       EKG  (2/26/19) sinus tachycardia at 127 bpm.  Nonspecific T wave changes    ECHO  IMPRESSION & PLAN:  Ms. Maylin Ortez is 25 y.o. female    Supraventricular tachycardia:  Patient underwent typical slow pathway AVNRT ablation in July 2019. Symptoms have essentially resolved. She has apple iWatch and has not received any notification of tachycardia since the procedure. Continue with clinical observation    Following are the tracing from apple iWatch brought by patient before ablation. This plan was discussed with patient who is in agreement. Thank you for allowing me to participate in patient care. Please feel free to call me if you have any question or concern. Lui Tamez MD  Please note: This document has been produced using voice recognition software. Unrecognized errors in transcription may be present.

## 2020-12-07 ENCOUNTER — OFFICE VISIT (OUTPATIENT)
Dept: CARDIOLOGY CLINIC | Age: 25
End: 2020-12-07
Payer: COMMERCIAL

## 2020-12-07 VITALS
OXYGEN SATURATION: 98 % | WEIGHT: 142 LBS | HEIGHT: 64 IN | SYSTOLIC BLOOD PRESSURE: 118 MMHG | HEART RATE: 103 BPM | RESPIRATION RATE: 16 BRPM | DIASTOLIC BLOOD PRESSURE: 79 MMHG | BODY MASS INDEX: 24.24 KG/M2 | TEMPERATURE: 97.9 F

## 2020-12-07 DIAGNOSIS — R07.9 CHEST PAIN ON EXERTION: Primary | ICD-10-CM

## 2020-12-07 PROCEDURE — 99214 OFFICE O/P EST MOD 30 MIN: CPT | Performed by: INTERNAL MEDICINE

## 2020-12-07 NOTE — LETTER
12/7/20 Patient: Otto Hayes YOB: 1995 Date of Visit: 12/7/2020 Pk Lau MD 
[address] [facility] [address] [address] [facility] [address] VIA Facsimile: [phone] Dear Pk Lau MD, Thank you for referring Ms. Otto Hayes to [facility] AT [address] [place] [address] for evaluation. My notes for this consultation are attached. If you have questions, please do not hesitate to call me. I look forward to following your patient along with you. Sincerely, Dennis Jacinto MD

## 2020-12-07 NOTE — PROGRESS NOTES
[facility]      Ms. Jelena porter is 22 y.o. female with a history of supraventricular tachycardia status post ablation in July 2019. Patient is here today for follow-up appointment. Overall she is doing fairly well. She does not appear to have any symptoms that is concerning for angina  Patient perform exercise in the form of outdoor running. Last time she ran was last month and she was able to run 6 miles with average heart rate of 170 bpm according to her apple watch  Lately she has been feeling occasional palpitation however she has not received any notification by apple watch regarding arrhythmia or tachycardia. Her resting heart rate is about 70 according to patient and it goes up to 027551 with day-to-day activity. She denies presyncope or syncope    Past Medical History:   Diagnosis Date    Acne     Dysmenorrhea     Controlled on OCPs, doing better on desogen    Lipoma of neck 7/2/2014    Small (~2 cm) and nontender     Ovarian cyst     Hx of cyst, pain with rupture    SVT (supraventricular tachycardia) ([facility])     S/P -Typical slow pathway AVNRT radiofrequency ablation (08/19)         Past Surgical History:   Procedure Laterality Date    MN COMPRE ELECTROPHYSIOL XM W/LEFT ATRIAL PACNG/REC N/A 8/5/2019    LT ATRIAL PACE & RECORD DURING EP STUDY performed by Florence Eugene MD at [facility] CATH LAB    MN EPHYS EVAL W/ABLATION [address] N/A 8/5/2019    ABLATION SVT performed by Florence Eugene MD at [facility] CATH LAB    MN INTRACARDIAC ELECTROPHYSIOLOGIC 3D MAPPING N/A 8/5/2019    Ep 3d Mapping performed by Florence Eugene MD at [facility] CATH LAB       Current Outpatient Medications   Medication Sig    fluticasone propionate (FLONASE ALLERGY RELIEF NA) by Nasal route.  ALPRAZolam (XANAX) 0.25 mg tablet Take one tablet as needed for palpitations/heart racing that lasts > 3 minutes.   Max 2 tablets in 24 hours.  drospirenone-ethinyl estradiol (LORYNA, 28,) 3-0.02 mg tab Take  by mouth daily. No current facility-administered medications for this visit. Allergies and Sensitivities:  Allergies   Allergen Reactions    Sulfa (Sulfonamide Antibiotics) Rash     Hives       Family History:  Family History   Problem Relation Age of Onset    Heart Disease Paternal Grandfather        Social History:  Social History     Tobacco Use    Smoking status: Never Smoker    Smokeless tobacco: Never Used   Substance Use Topics    Alcohol use: No    Drug use: No     She  reports that she has never smoked. She has never used smokeless tobacco.  She  reports no history of alcohol use. Review of Systems:  Cardiac symptoms as noted above in HPI. All others negative. Physical Exam:  BP Readings from Last 3 Encounters:   12/07/20 118/79   10/22/19 127/86   08/05/19 130/74         Pulse Readings from Last 3 Encounters:   12/07/20 (!) 103   10/22/19 89   08/05/19 86          Wt Readings from Last 3 Encounters:   12/07/20 142 lb (64.4 kg)   10/22/19 159 lb (72.1 kg)   08/05/19 160 lb (72.6 kg)       Constitutional: Oriented to person, place, and time. HENT: Head: Normocephalic and atraumatic. Neck: No JVD present. Cardiovascular: Regular rhythm. No murmur, gallop or rubs appreciated  Lung: Breath sounds normal. No respiratory distress. No ronchi or rales appreciated  Abdominal: No tenderness. No rebound and no guarding. Musculoskeletal: There is no lower extremity edema.  No cynosis    Review of Data  LABS:   Lab Results   Component Value Date/Time    Sodium 139 07/29/2019 11:09 AM    Potassium 4.2 07/29/2019 11:09 AM    Chloride 102 07/29/2019 11:09 AM    CO2 25 07/29/2019 11:09 AM    Glucose 84 07/29/2019 11:09 AM    BUN 9 07/29/2019 11:09 AM    Creatinine 0.7 07/29/2019 11:09 AM     Lipids Latest Ref Rng & Units 8/3/2018   Chol, Total 110 - 200 mg/dL 178   HDL 40 - 59 mg/dL 62(H)   LDL 50 - 99 mg/dL 104(H) Trig 40 - 149 mg/dL 60   Some recent data might be hidden     Lab Results   Component Value Date/Time    ALT (SGPT) 12 07/29/2019 11:09 AM     Lab Results   Component Value Date/Time    Hemoglobin A1c 5.3 08/03/2018 07:30 PM       EKG  (2/26/19) sinus tachycardia at 127 bpm.  Nonspecific T wave changes    ECHO (07/19)  Left Ventricle  Normal cavity size, wall thickness, systolic function (ejection fraction normal) and diastolic function. The estimated ejection fraction is 61 - 65%. Visually measured ejection fraction. Wall Scoring  The left ventricular wall motion is normal.             Left Atrium  Normal cavity size. Right Ventricle  Normal cavity size and global systolic function. Right Atrium  Normal cavity size. Aortic Valve  Trileaflet valve structure, no stenosis and no regurgitation. Mitral Valve  Normal valve structure and no stenosis. Trace regurgitation. Tricuspid Valve  Normal valve structure and no stenosis. Tricuspid regurgitation is inadequate for estimation of right ventricular systolic pressure. Pulmonic Valve  Pulmonic valve not well visualized. No stenosis. Trace regurgitation. Aorta  Normal aortic root. IVC/Hepatic Veins  Normal central venous pressure (0-5 mmHg); IVC diameter is less than 21 mm and collapses more than 50% with respiration. IMPRESSION & PLAN:  Ms. Richards Patch is 22 y.o. female    Supraventricular tachycardia:  Patient underwent typical slow pathway AVNRT ablation in July 2019. She has apple watch and has not received any notification of tachycardia since the procedure. She is able to perform activity of daily living without any problem. Continue with clinical observation  Discussed placement of event monitor with occasional palpitation however I suspect it will be low yield.   After discussion, patient has decided to continue with clinical observation as she is overall feeling okay and her apple watch has not received any notification regarding her heart rate. Following are the tracing from apple iWatch brought by patient before ablation. This plan was discussed with patient who is in agreement. Thank you for allowing me to participate in patient care. Please feel free to call me if you have any question or concern. Rosa Hernandez MD  Please note: This document has been produced using voice recognition software. Unrecognized errors in transcription may be present.

## 2020-12-07 NOTE — PROGRESS NOTES
Esteban Cazares presents today for   Chief Complaint   Patient presents with    Follow-up     6 month       Esteban Cazares preferred language for health care discussion is english/other. Personal Protective Equipment:   Personal Protective Equipment was used including: mask-surgical and hands-gloves. Patient was placed on no precaution(s). Patient was masked. Is someone accompanying this pt? No    Is the patient using any DME equipment during OV? No    Depression Screening:  3 most recent PHQ Screens 10/22/2019   Little interest or pleasure in doing things Not at all   Feeling down, depressed, irritable, or hopeless Not at all   Total Score PHQ 2 0       Learning Assessment:  Learning Assessment 12/18/2015   PRIMARY LEARNER Patient   HIGHEST LEVEL OF EDUCATION - PRIMARY LEARNER  SOME COLLEGE   BARRIERS PRIMARY LEARNER NONE   CO-LEARNER CAREGIVER No   PRIMARY LANGUAGE ENGLISH    NEED No   LEARNER PREFERENCE PRIMARY READING   LEARNING SPECIAL TOPICS none   ANSWERED BY patient   RELATIONSHIP SELF   ASSESSMENT COMMENT n/a       Abuse Screening:  Abuse Screening Questionnaire 8/3/2018   Do you ever feel afraid of your partner? N   Are you in a relationship with someone who physically or mentally threatens you? N   Is it safe for you to go home? Y       Fall Risk  No flowsheet data found. Pt currently taking Anticoagulant therapy? No    Coordination of Care:  1. Have you been to the ER, urgent care clinic since your last visit? Hospitalized since your last visit? No    2. Have you seen or consulted any other health care providers outside of the [address] [place] [address] since your last visit? Include any pap smears or colon screening.  No

## 2022-10-24 ENCOUNTER — OFFICE VISIT (OUTPATIENT)
Dept: CARDIOLOGY CLINIC | Age: 27
End: 2022-10-24
Payer: COMMERCIAL

## 2022-10-24 VITALS
HEART RATE: 84 BPM | WEIGHT: 171 LBS | OXYGEN SATURATION: 98 % | HEIGHT: 64 IN | BODY MASS INDEX: 29.19 KG/M2 | SYSTOLIC BLOOD PRESSURE: 128 MMHG | DIASTOLIC BLOOD PRESSURE: 80 MMHG

## 2022-10-24 DIAGNOSIS — I47.1 SVT (SUPRAVENTRICULAR TACHYCARDIA) (HCC): ICD-10-CM

## 2022-10-24 DIAGNOSIS — R07.9 CHEST PAIN ON EXERTION: Primary | ICD-10-CM

## 2022-10-24 DIAGNOSIS — R00.2 HEART PALPITATIONS: ICD-10-CM

## 2022-10-24 PROCEDURE — 93000 ELECTROCARDIOGRAM COMPLETE: CPT | Performed by: INTERNAL MEDICINE

## 2022-10-24 PROCEDURE — 99214 OFFICE O/P EST MOD 30 MIN: CPT | Performed by: INTERNAL MEDICINE

## 2022-10-24 NOTE — PROGRESS NOTES
[facility]      Ms. Senait Ross is is 32 y.o. female with a history of supraventricular tachycardia status post ablation in July 2019. Patient is here today for follow-up appointment. I have not seen her since 12/2020. Patient made an appointment today for evaluation of some cardiac symptoms she has been experiencing the last few months. She described that she has been having some chest feeling of coldness which she does not believe is chest tightness however she also has been having some left shoulder discomfort and this episodes are random without any associated nausea vomiting diaphoresis. There is no exertional symptoms. She also has been feeling some palpitation however not as intense as what she was feeling before having ablation  Denies any presyncope or syncope. She has been diagnosed with hypothyroidism however has not started any medication    Past Medical History:   Diagnosis Date    Acne     Dysmenorrhea     Controlled on OCPs, doing better on desogen    Lipoma of neck 7/2/2014    Small (~2 cm) and nontender     Ovarian cyst     Hx of cyst, pain with rupture    SVT (supraventricular tachycardia) (HCC)     S/P -Typical slow pathway AVNRT radiofrequency ablation (08/19)         Past Surgical History:   Procedure Laterality Date    OH COMPRE ELECTROPHYSIOL XM W/LEFT ATRIAL PACNG/REC N/A 8/5/2019    LT ATRIAL PACE & RECORD DURING EP STUDY performed by Susan Beltran MD at [facility] CATH LAB    OH COMPRE EP EVAL ABLTJ 3D MAPG TX SVT N/A 8/5/2019    ABLATION SVT performed by Susan Beltran MD at [facility] CATH LAB    OH INTRACARDIAC ELECTROPHYSIOLOGIC 3D MAPPING N/A 8/5/2019    Ep 3d Mapping performed by Susan Beltran MD at [facility] CATH LAB       Current Outpatient Medications   Medication Sig    fluticasone propionate (FLONASE ALLERGY RELIEF NA) by Nasal route.     drospirenone-ethinyl estradiol (LORYNA, 28,) 3-0.02 mg tab Take  by mouth daily. No current facility-administered medications for this visit. Allergies and Sensitivities:  Allergies   Allergen Reactions    Sulfa (Sulfonamide Antibiotics) Rash     Hives       Family History:  Family History   Problem Relation Age of Onset    Heart Disease Paternal Grandfather        Social History:  Social History     Tobacco Use    Smoking status: Never    Smokeless tobacco: Never   Substance Use Topics    Alcohol use: No    Drug use: No     She  reports that she has never smoked. She has never used smokeless tobacco.  She  reports no history of alcohol use. Review of Systems:  Cardiac symptoms as noted above in HPI. All others negative. Physical Exam:  BP Readings from Last 3 Encounters:   10/24/22 128/80   12/07/20 118/79   10/22/19 127/86         Pulse Readings from Last 3 Encounters:   10/24/22 84   12/07/20 (!) 103   10/22/19 89          Wt Readings from Last 3 Encounters:   10/24/22 77.6 kg (171 lb)   12/07/20 64.4 kg (142 lb)   10/22/19 72.1 kg (159 lb)       Constitutional: Oriented to person, place, and time. HENT: Head: Normocephalic and atraumatic. Neck: No JVD present. Cardiovascular: Regular rhythm. No murmur, gallop or rubs appreciated  Lung: Breath sounds normal. No respiratory distress. No ronchi or rales appreciated  Abdominal: No tenderness. No rebound and no guarding. Musculoskeletal: There is no lower extremity edema. No cynosis    Review of Data  LABS:   Lab Results   Component Value Date/Time    Sodium 139 07/29/2019 11:09 AM    Potassium 4.2 07/29/2019 11:09 AM    Chloride 102 07/29/2019 11:09 AM    CO2 25 07/29/2019 11:09 AM    Glucose 84 07/29/2019 11:09 AM    BUN 9 07/29/2019 11:09 AM    Creatinine 0.7 07/29/2019 11:09 AM     No flowsheet data found.     Lab Results   Component Value Date/Time    ALT (SGPT) 12 07/29/2019 11:09 AM     Lab Results   Component Value Date/Time    Hemoglobin A1c 5.3 08/03/2018 07:30 PM EKG  (2/26/19) sinus tachycardia at 127 bpm.  Nonspecific T wave changes  10/24/2022: Sinus rhythm at 84 bpm.  Normal ME and QRS interval.  No ST changes of ischemia. ECHO (07/19)  Left Ventricle  Normal cavity size, wall thickness, systolic function (ejection fraction normal) and diastolic function. The estimated ejection fraction is 61 - 65%. Visually measured ejection fraction. Wall Scoring  The left ventricular wall motion is normal.               IMPRESSION & PLAN:  Ms. Senait Ross is 32 y.o. female    Supraventricular tachycardia:  Patient underwent typical slow pathway AVNRT ablation in July 2019. Has been experiencing some palpitation however not as intense as before  Apple watch suggesting some tachycardic event but unclear if sinus tachycardia or not. I reviewed those and it appears regular rhythm but no clear P waves. Will place event monitor to identify any arrhythmia or recurrence  Echo order to rule out any abnormal cardiac structure    Chest discomfort:  Highly atypical for angina. No risk factors however as symptoms are recurrent, would proceed with treadmill stress test for risk stratification. Echo ordered to rule out abnormal cardiac structure    This plan was discussed with patient who is in agreement. Thank you for allowing me to participate in patient care. Please feel free to call me if you have any question or concern. Kurt Honeycutt MD  Please note: This document has been produced using voice recognition software. Unrecognized errors in transcription may be present.

## 2022-10-24 NOTE — PROGRESS NOTES
Real Stacias presents today for   Chief Complaint   Patient presents with    Follow-up     C/o chest pain     Chest Pain     Increased tightness and sharp occasionally     Shortness of Breath     When having chest pains     Palpitations     Skips beat and flutters   Ami Fowler preferred language for health care discussion is english/other. Is someone accompanying this pt? Yes,     Is the patient using any DME equipment during [address]? no    Depression Screening:  3 most recent PHQ Screens 10/24/2022   Little interest or pleasure in doing things Not at all   Feeling down, depressed, irritable, or hopeless Not at all   Total Score PHQ 2 0       Learning Assessment:  Learning Assessment 12/18/2015   PRIMARY LEARNER Patient   HIGHEST LEVEL OF EDUCATION - PRIMARY LEARNER  SOME COLLEGE   BARRIERS PRIMARY LEARNER NONE   CO-LEARNER CAREGIVER No   PRIMARY LANGUAGE ENGLISH    NEED No   LEARNER PREFERENCE PRIMARY READING   LEARNING SPECIAL TOPICS none   ANSWERED BY patient   RELATIONSHIP SELF   ASSESSMENT COMMENT n/a       Abuse Screening:  Abuse Screening Questionnaire 10/24/2022   Do you ever feel afraid of your partner? N   Are you in a relationship with someone who physically or mentally threatens you? N   Is it safe for you to go home? Y       Fall Risk  No flowsheet data found. Pt currently taking Anticoagulant therapy? no    Coordination of Care:  1. Have you been to the ER, urgent care clinic since your last visit? Hospitalized since your last visit? no    2. Have you seen or consulted any other health care providers outside of the [address] [place] [address] since your last visit? Include any pap smears or colon screening.  no

## 2022-11-23 ENCOUNTER — TELEPHONE (OUTPATIENT)
Dept: CARDIOLOGY CLINIC | Age: 27
End: 2022-11-23

## 2023-10-12 ENCOUNTER — OFFICE VISIT (OUTPATIENT)
Age: 28
End: 2023-10-12
Payer: COMMERCIAL

## 2023-10-12 VITALS
BODY MASS INDEX: 33.99 KG/M2 | SYSTOLIC BLOOD PRESSURE: 129 MMHG | WEIGHT: 198 LBS | HEART RATE: 89 BPM | OXYGEN SATURATION: 100 % | DIASTOLIC BLOOD PRESSURE: 80 MMHG

## 2023-10-12 DIAGNOSIS — I47.10 SUPRAVENTRICULAR TACHYCARDIA: Primary | ICD-10-CM

## 2023-10-12 PROCEDURE — 99213 OFFICE O/P EST LOW 20 MIN: CPT | Performed by: INTERNAL MEDICINE

## 2023-10-12 PROCEDURE — 93000 ELECTROCARDIOGRAM COMPLETE: CPT | Performed by: INTERNAL MEDICINE

## 2023-10-12 NOTE — PROGRESS NOTES
[facility]    Mariah Chand is 29 y.o. female     Denies any resting or exertional chest pain or chest pressure to suggest angina or any dyspnea to suggest heart failure. No presyncope or syncope  Denies any PND or LE edema. Occasional palpitation and rapid heartbeat sometimes at rest sometimes with exertion however not prolonged duration and not associated with any other symptoms  Occasional sharp knife discomfort under left side of the breast, nonexertional lasting for few seconds    Denies any nausea, vomiting, abdominal pain, fever, chills, sputum production. No hematuria or other bleeding complaints    Past Medical History:   Diagnosis Date    Acne     Dysmenorrhea     Controlled on OCPs, doing better on desogen    Lipoma of neck 7/2/2014    Small (    Ovarian cyst     Hx of cyst, pain with rupture    SVT (supraventricular tachycardia) (HCC)     S/P -Typical slow pathway AVNRT radiofrequency ablation (08/19)       Review of Systems:  Cardiac symptoms as noted above in HPI. All others negative. Denies fatigue, malaise, skin rash, joint pain, blurring vision, photophobia, neck pain, hemoptysis, chronic cough, nausea, vomiting, hematuria, burning micturition, BRBPR, chronic headaches. Current Outpatient Medications   Medication Sig    drospirenone-ethinyl estradiol (MALU) 3-0.02 MG per tablet Take by mouth daily     No current facility-administered medications for this visit.        Past Surgical History:   Procedure Laterality Date    CARDIAC SURGERY N/A 8/5/2019    ABLATION SVT performed by Karthik Rivas MD at [address] CATH LAB    COMPRE ELECTROPHYSIOL XM W/LEFT ATRIAL PACNG/REC N/A 8/5/2019    LT ATRIAL PACE & RECORD DURING EP STUDY performed by Karthik Rivas MD at [address] CATH LAB    INTRACARDIAC ELECTROPHYSIOLOGIC 3D MAPPING N/A 8/5/2019    Ep 3d Mapping performed by Karthik Rivas MD at [id number] CATH LAB

## 2023-10-12 NOTE — PROGRESS NOTES
Identified pt with two pt identifiers(name and ). Reviewed record in preparation for visit and have obtained necessary documentation. Dominga Drew presents today for   Chief Complaint   Patient presents with    Follow-up       Pt c/o  CHEST PAIN/ PRESSURE, WEAKNESS. Dominga Drew preferred language for health care discussion is english/other. Personal Protective Equipment:   Personal Protective Equipment was used including: mask-surgical and hands-gloves. Patient was placed on no precaution(s). Patient was not masked. Precautions:   Patient currently on None  Patient currently roomed with door closed. Is someone accompanying this pt? no    Is the patient using any DME equipment during [address]? no    Depression Screening:      10/24/2022    11:56 AM   PHQ-9 Questionaire   Little interest or pleasure in doing things 0   Feeling down, depressed, or hopeless 0   PHQ-9 Total Score 0        Learning Assessment:  No question data found. Abuse Screening:      10/12/2023    11:00 AM   AMB Abuse Screening   Do you ever feel afraid of your partner? N   Are you in a relationship with someone who physically or mentally threatens you? N   Is it safe for you to go home? Y          Fall Risk      10/12/2023    11:37 AM   Fall Risk   2 or more falls in past year? no   Fall with injury in past year? no         Pt currently taking Anticoagulant therapy? no  Pt currently taking Antiplatelet therapy? no    Coordination of Care:  1. Have you been to the ER, urgent care clinic since your last visit? Hospitalized since your last visit? no    2. Have you seen or consulted any other health care providers outside of the [address] [place] [address] since your last visit? Include any pap smears or colon screening. no      Please see Red banners under Allergies and Med Rec to remove outside inquires. All correct information has been verified with patient and added to chart.      Medication's patient's would liked removed

## (undated) DEVICE — PATCH CARTO 3 EXT REF --

## (undated) DEVICE — KENDALL RADIOLUCENT FOAM MONITORING ELECTRODE RECTANGULAR SHAPE: Brand: KENDALL

## (undated) DEVICE — FIXED CURVE DIAGNOSTIC CATHETER: Brand: VIKING™ SOFT TIP

## (undated) DEVICE — INTRODUCER SHTH 6FR CANN L11CM DIL TIP 35MM GRN TUNGSTEN

## (undated) DEVICE — CATHETER ELECTROPHYSIOLOGY CRD 2 5 MM 6 FRX120 CM SUPREME

## (undated) DEVICE — LIMB HOLDER, WRIST/ANKLE: Brand: DEROYAL

## (undated) DEVICE — REM POLYHESIVE ADULT PATIENT RETURN ELECTRODE: Brand: VALLEYLAB

## (undated) DEVICE — DRESSING HEMSTAT W4XL4IN 4 PLY WHT IMPREG KAOLIN HYDRPHLC

## (undated) DEVICE — MEDI-TRACE CADENCE ADULT, DEFIBRILLATION ELECTRODE -RTS  (10 PR/PK) - PHYSIO-CONTROL: Brand: MEDI-TRACE CADENCE

## (undated) DEVICE — CATH BIDRCT DF 8FR 115CMX3.5MM --

## (undated) DEVICE — INTRODUCER SHTH 7FR CANN L11CM DIL TIP 35MM ORNG TUNGSTEN

## (undated) DEVICE — SHEATH GUID 11.5X8.5FR L72CM BI DIR SM CRV MOBICATH

## (undated) DEVICE — TUBE SET IRR PUMP THERMALCOOL -- SMARTABLATE

## (undated) DEVICE — INTRODUCER SHTH 8FR CANN L11CM DIL TIP 35MM BLU TUNGSTEN

## (undated) DEVICE — PACK PROCEDURE SURG VASC CATH 161 MMC LF

## (undated) DEVICE — UNIDIRECTIONAL STEERABLE DIAGNOSTIC CATHETER: Brand: EP XT™